# Patient Record
Sex: MALE | Race: WHITE | NOT HISPANIC OR LATINO | URBAN - METROPOLITAN AREA
[De-identification: names, ages, dates, MRNs, and addresses within clinical notes are randomized per-mention and may not be internally consistent; named-entity substitution may affect disease eponyms.]

---

## 2017-01-01 ENCOUNTER — COMMUNICATION - HEALTHEAST (OUTPATIENT)
Dept: INTERNAL MEDICINE | Facility: CLINIC | Age: 72
End: 2017-01-01

## 2017-01-01 ENCOUNTER — OFFICE VISIT - HEALTHEAST (OUTPATIENT)
Dept: PULMONOLOGY | Facility: OTHER | Age: 72
End: 2017-01-01

## 2017-01-01 ENCOUNTER — COMMUNICATION - HEALTHEAST (OUTPATIENT)
Dept: PULMONOLOGY | Facility: OTHER | Age: 72
End: 2017-01-01

## 2017-01-01 ENCOUNTER — AMBULATORY - HEALTHEAST (OUTPATIENT)
Dept: PULMONOLOGY | Facility: OTHER | Age: 72
End: 2017-01-01

## 2017-01-01 ENCOUNTER — RECORDS - HEALTHEAST (OUTPATIENT)
Dept: ADMINISTRATIVE | Facility: OTHER | Age: 72
End: 2017-01-01

## 2017-01-01 ENCOUNTER — OFFICE VISIT - HEALTHEAST (OUTPATIENT)
Dept: INTERNAL MEDICINE | Facility: CLINIC | Age: 72
End: 2017-01-01

## 2017-01-01 ENCOUNTER — COMMUNICATION - HEALTHEAST (OUTPATIENT)
Dept: SCHEDULING | Facility: CLINIC | Age: 72
End: 2017-01-01

## 2017-01-01 ENCOUNTER — HOSPITAL ENCOUNTER (OUTPATIENT)
Dept: ULTRASOUND IMAGING | Facility: HOSPITAL | Age: 72
Discharge: HOME | End: 2017-05-05
Attending: SURGERY

## 2017-01-01 ENCOUNTER — OFFICE VISIT - HEALTHEAST (OUTPATIENT)
Dept: CARDIOLOGY | Facility: CLINIC | Age: 72
End: 2017-01-01

## 2017-01-01 ENCOUNTER — COMMUNICATION - HEALTHEAST (OUTPATIENT)
Dept: FAMILY MEDICINE | Facility: CLINIC | Age: 72
End: 2017-01-01

## 2017-01-01 ENCOUNTER — COMMUNICATION - HEALTHEAST (OUTPATIENT)
Dept: SLEEP MEDICINE | Facility: CLINIC | Age: 72
End: 2017-01-01

## 2017-01-01 ENCOUNTER — HOSPITAL ENCOUNTER (OUTPATIENT)
Dept: CT IMAGING | Facility: HOSPITAL | Age: 72
Discharge: HOME | End: 2017-05-12
Attending: SURGERY

## 2017-01-01 ENCOUNTER — AMBULATORY - HEALTHEAST (OUTPATIENT)
Dept: EDUCATION SERVICES | Facility: CLINIC | Age: 72
End: 2017-01-01

## 2017-01-01 ENCOUNTER — ANESTHESIA - HEALTHEAST (OUTPATIENT)
Dept: CARDIOLOGY | Facility: CLINIC | Age: 72
End: 2017-01-01

## 2017-01-01 ENCOUNTER — HOSPITAL ENCOUNTER (OUTPATIENT)
Dept: CT IMAGING | Facility: HOSPITAL | Age: 72
Discharge: HOME | End: 2017-01-17
Attending: INTERNAL MEDICINE

## 2017-01-01 ENCOUNTER — AMBULATORY - HEALTHEAST (OUTPATIENT)
Dept: CARDIOLOGY | Facility: CLINIC | Age: 72
End: 2017-01-01

## 2017-01-01 DIAGNOSIS — I48.20 CHRONIC ATRIAL FIBRILLATION (H): ICD-10-CM

## 2017-01-01 DIAGNOSIS — I50.32 CHRONIC DIASTOLIC HEART FAILURE (H): ICD-10-CM

## 2017-01-01 DIAGNOSIS — E11.9 DIABETES MELLITUS, TYPE 2 (H): ICD-10-CM

## 2017-01-01 DIAGNOSIS — G47.33 OBSTRUCTIVE SLEEP APNEA: ICD-10-CM

## 2017-01-01 DIAGNOSIS — J84.9 ILD (INTERSTITIAL LUNG DISEASE) (H): ICD-10-CM

## 2017-01-01 DIAGNOSIS — E11.9 DIABETES (H): ICD-10-CM

## 2017-01-01 DIAGNOSIS — Z00.01 ENCOUNTER FOR GENERAL ADULT MEDICAL EXAMINATION WITH ABNORMAL FINDINGS: ICD-10-CM

## 2017-01-01 DIAGNOSIS — D64.9 ANEMIA: ICD-10-CM

## 2017-01-01 DIAGNOSIS — I50.9 CHF (CONGESTIVE HEART FAILURE) (H): ICD-10-CM

## 2017-01-01 DIAGNOSIS — J84.10 PULMONARY FIBROSIS (H): ICD-10-CM

## 2017-01-01 DIAGNOSIS — I10 ESSENTIAL HYPERTENSION: ICD-10-CM

## 2017-01-01 DIAGNOSIS — J96.21 ACUTE ON CHRONIC RESPIRATORY FAILURE WITH HYPOXIA (H): ICD-10-CM

## 2017-01-01 DIAGNOSIS — R05.9 COUGH: ICD-10-CM

## 2017-01-01 DIAGNOSIS — J84.9 INTERSTITIAL LUNG DISEASE (H): ICD-10-CM

## 2017-01-01 DIAGNOSIS — R05.3 CHRONIC COUGH: ICD-10-CM

## 2017-01-01 DIAGNOSIS — F41.8 DEPRESSION WITH ANXIETY: ICD-10-CM

## 2017-01-01 DIAGNOSIS — Z71.89 ADVANCED CARE PLANNING/COUNSELING DISCUSSION: ICD-10-CM

## 2017-01-01 DIAGNOSIS — E11.9 TYPE 2 DIABETES MELLITUS WITHOUT COMPLICATION, WITHOUT LONG-TERM CURRENT USE OF INSULIN (H): ICD-10-CM

## 2017-01-01 DIAGNOSIS — E87.6 HYPOKALEMIA: ICD-10-CM

## 2017-01-01 DIAGNOSIS — E78.5 HYPERLIPIDEMIA: ICD-10-CM

## 2017-01-01 DIAGNOSIS — I25.10 CORONARY ARTERY DISEASE INVOLVING NATIVE CORONARY ARTERY OF NATIVE HEART WITHOUT ANGINA PECTORIS: ICD-10-CM

## 2017-01-01 DIAGNOSIS — I71.40 ABDOMINAL AORTIC ANEURYSM (AAA) WITHOUT RUPTURE (H): ICD-10-CM

## 2017-01-01 DIAGNOSIS — Z12.11 SCREENING FOR COLON CANCER: ICD-10-CM

## 2017-01-01 DIAGNOSIS — I71.40 ANEURYSM OF ABDOMINAL VESSEL (H): ICD-10-CM

## 2017-01-01 DIAGNOSIS — J96.11 CHRONIC RESPIRATORY FAILURE WITH HYPOXIA (H): ICD-10-CM

## 2017-01-01 DIAGNOSIS — I48.19 PERSISTENT ATRIAL FIBRILLATION (H): ICD-10-CM

## 2017-01-01 DIAGNOSIS — J30.89 PERENNIAL ALLERGIC RHINITIS, UNSPECIFIED ALLERGIC RHINITIS TRIGGER: ICD-10-CM

## 2017-01-01 DIAGNOSIS — I71.40 AAA (ABDOMINAL AORTIC ANEURYSM) (H): ICD-10-CM

## 2017-01-01 DIAGNOSIS — E66.9 OBESITY, UNSPECIFIED: ICD-10-CM

## 2017-01-01 DIAGNOSIS — I50.31 ACUTE DIASTOLIC CHF (CONGESTIVE HEART FAILURE) (H): ICD-10-CM

## 2017-01-01 DIAGNOSIS — E11.8 TYPE 2 DIABETES MELLITUS WITH COMPLICATION, WITHOUT LONG-TERM CURRENT USE OF INSULIN (H): ICD-10-CM

## 2017-01-01 DIAGNOSIS — I48.0 PAROXYSMAL ATRIAL FIBRILLATION (H): ICD-10-CM

## 2017-01-01 DIAGNOSIS — R60.9 EDEMA: ICD-10-CM

## 2017-01-01 DIAGNOSIS — I42.2 HYPERTROPHIC CARDIOMYOPATHY (H): ICD-10-CM

## 2017-01-01 DIAGNOSIS — R09.82 POST-NASAL DRIP: ICD-10-CM

## 2017-01-01 DIAGNOSIS — J84.9 CHRONIC INTERSTITIAL LUNG DISEASE (H): ICD-10-CM

## 2017-01-01 DIAGNOSIS — K21.9 GASTROESOPHAGEAL REFLUX DISEASE WITHOUT ESOPHAGITIS: ICD-10-CM

## 2017-01-01 DIAGNOSIS — E78.2 MIXED HYPERLIPIDEMIA: ICD-10-CM

## 2017-01-01 DIAGNOSIS — I50.33 ACUTE ON CHRONIC DIASTOLIC HEART FAILURE (H): ICD-10-CM

## 2017-01-01 DIAGNOSIS — E11.9 TYPE 2 DIABETES MELLITUS, CONTROLLED (H): ICD-10-CM

## 2017-01-01 LAB
ANA SER QL: 1.5 U
CHOLEST SERPL-MCNC: 161 MG/DL
FASTING STATUS PATIENT QL REPORTED: YES
HBA1C MFR BLD: 7.6 % (ref 3.5–6)
HBA1C MFR BLD: 7.7 % (ref 3.5–6)
HBA1C MFR BLD: 7.8 % (ref 3.5–6)
HDLC SERPL-MCNC: 33 MG/DL
LDLC SERPL CALC-MCNC: 95 MG/DL
TRIGL SERPL-MCNC: 164 MG/DL

## 2017-01-01 RX ORDER — GABAPENTIN 300 MG/1
CAPSULE ORAL
Qty: 270 CAPSULE | Refills: 3 | Status: SHIPPED | OUTPATIENT
Start: 2017-01-01

## 2017-01-01 RX ORDER — BISOPROLOL FUMARATE 5 MG/1
5 TABLET, FILM COATED ORAL DAILY
Qty: 90 TABLET | Refills: 0 | Status: SHIPPED | OUTPATIENT
Start: 2017-01-01 | End: 2018-08-08

## 2017-01-01 RX ORDER — GLIPIZIDE 10 MG/1
TABLET, FILM COATED, EXTENDED RELEASE ORAL
Qty: 90 TABLET | Refills: 1 | Status: SHIPPED | OUTPATIENT
Start: 2017-01-01

## 2017-01-01 RX ORDER — FUROSEMIDE 40 MG
TABLET ORAL
Qty: 60 TABLET | Refills: 0 | Status: SHIPPED
Start: 2017-01-01

## 2017-01-01 RX ORDER — GLUCOSAMINE HCL/CHONDROITIN SU 500-400 MG
1 CAPSULE ORAL 2 TIMES DAILY
Qty: 100 EACH | Refills: 3 | Status: SHIPPED | OUTPATIENT
Start: 2017-01-01

## 2017-01-01 RX ORDER — IRBESARTAN 300 MG/1
300 TABLET ORAL AT BEDTIME
Qty: 90 TABLET | Refills: 3 | Status: SHIPPED
Start: 2017-01-01

## 2017-01-01 ASSESSMENT — MIFFLIN-ST. JEOR
SCORE: 1569.54
SCORE: 1569.54
SCORE: 1565
SCORE: 1601.29
SCORE: 1596.76
SCORE: 1583.15

## 2018-01-01 ENCOUNTER — COMMUNICATION - HEALTHEAST (OUTPATIENT)
Dept: SCHEDULING | Facility: CLINIC | Age: 73
End: 2018-01-01

## 2021-05-26 ENCOUNTER — RECORDS - HEALTHEAST (OUTPATIENT)
Dept: ADMINISTRATIVE | Facility: CLINIC | Age: 76
End: 2021-05-26

## 2021-05-29 ENCOUNTER — RECORDS - HEALTHEAST (OUTPATIENT)
Dept: ADMINISTRATIVE | Facility: CLINIC | Age: 76
End: 2021-05-29

## 2021-05-30 ENCOUNTER — RECORDS - HEALTHEAST (OUTPATIENT)
Dept: ADMINISTRATIVE | Facility: CLINIC | Age: 76
End: 2021-05-30

## 2021-05-30 VITALS — WEIGHT: 208 LBS | BODY MASS INDEX: 36.85 KG/M2

## 2021-05-30 VITALS — WEIGHT: 206 LBS | BODY MASS INDEX: 36.49 KG/M2

## 2021-05-30 VITALS — WEIGHT: 210 LBS | BODY MASS INDEX: 37.2 KG/M2

## 2021-05-31 ENCOUNTER — RECORDS - HEALTHEAST (OUTPATIENT)
Dept: ADMINISTRATIVE | Facility: CLINIC | Age: 76
End: 2021-05-31

## 2021-05-31 VITALS — WEIGHT: 205 LBS | HEIGHT: 63 IN | BODY MASS INDEX: 36.32 KG/M2

## 2021-05-31 VITALS — BODY MASS INDEX: 36.5 KG/M2 | HEIGHT: 63 IN | WEIGHT: 206 LBS

## 2021-05-31 VITALS — HEIGHT: 63 IN | WEIGHT: 206 LBS | BODY MASS INDEX: 36.5 KG/M2

## 2021-05-31 VITALS — WEIGHT: 209 LBS | BODY MASS INDEX: 37.02 KG/M2

## 2021-05-31 VITALS — WEIGHT: 212 LBS | BODY MASS INDEX: 37.56 KG/M2 | HEIGHT: 63 IN

## 2021-05-31 VITALS — BODY MASS INDEX: 37.2 KG/M2 | WEIGHT: 210 LBS

## 2021-05-31 VITALS — BODY MASS INDEX: 37.74 KG/M2 | HEIGHT: 63 IN | WEIGHT: 213 LBS

## 2021-05-31 VITALS — BODY MASS INDEX: 37.38 KG/M2 | WEIGHT: 211 LBS

## 2021-05-31 VITALS — BODY MASS INDEX: 37.03 KG/M2 | HEIGHT: 63 IN | WEIGHT: 209 LBS

## 2021-06-01 ENCOUNTER — RECORDS - HEALTHEAST (OUTPATIENT)
Dept: ADMINISTRATIVE | Facility: CLINIC | Age: 76
End: 2021-06-01

## 2021-06-08 NOTE — PROGRESS NOTES
Assessment/Plan:        Diagnoses and all orders for this visit:    Chronic diastolic heart failure    Obstructive sleep apnea - uses BiPAP    Pulmonary fibrosis - possibly familial - both parents had pumonary fibrosis.  -     Ambulatory Referral to Pulmonary Rehab  -     CT Chest High Resolution Without Contrast; Future; Expected date: 1/17/17  -     Check Pulse Oximetry while ambulating    Complex presentation of what appears to be mild-moderate pulmonary hypertension in setting of known chronic heart failure, SOFI (treated) and ILD.  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  However it has been stable from an imaging standpoint for some time.    The severity of his pulmonary hypertension is within reason given the above comorbidities.  If he had poor RV function or higher estimated RVSP (eg 70s or higher) I would be concerned for pulmonary hypertension out of proportion to these comorbidities, limitations of TTE accepted.    I agree with Dr. Silver that he is not a clear candidate for advanced therapies for pulmonary hypertension.  He is well treated from a heart failure standpoint. SOFI is fully treated.  ILD is only factors that is not getting aggressive therapy.    The complications of prednisone in this gentleman on maximal oral therapy for diabetes are evident.  I cannot predict whether this would be helpful.  Wu has been hesitant about invasive procedures in the past.    Plan    1) Oxygen   -Room air is fine at rest   -At night use 2 lpm through the bipap   -with exertion - use 4 lpm.  We will send in a prescription for a portable concentrator.    2) CT scan to look at your lung scarring and check your lymph nodes    3) Pulmonary rehab prescription - [place]    Due to desaturation of SaO2 less than or equal to 88% on Room air from (diagnosis) interstitial lung disease, home oxygen therapy will benefit my patient's condition.  The patient has tried (or considered) medications with limited  success and oxygen is still required.  This patient is mobile in the home and requires portability.    Patient needs portable oxygen with ambulation at 4LPM/NC.  Patient also needs POC.  OK for conserving device.  OK to do titration study if needed--keep oxygen saturation 88%.  Please issue conserving device with appropriate titrated setting after patient completes successful assessment.    Patients room air saturation is 93%.  With ambulation on room air his saturation dropped to 81%.  On 2 lpm his oxygen saturation was 86% with ambulation.  On 4 lpm his saturation was 89% with ambulation.          Total time greater than 40 minutes greater than 50% spent on counseling and coordination of care.  Subjective:    Patient ID: Aydin Knutson Jr. is a 71 y.o. male.    HPI Comments: 71M with lung fibrosis and SOFI.    He was hospitalized for heart failure in November.  He was in the hospital for 4 days.  He had a syncopal event on a plane.  After that he improved a bit and then worsened and now has started improving just a little.    Diuresed a lot.    On RA at rest he is usually around 91%.  With minimal activity he drops to mid/low 80s.  Can get to 70s with sig exertion.    Extensive discussion about pathophysiology, background of his illness with Wu and his wife.    Pulepifanio Silver        --- from previous  Here for follow up of lung fibrosis and SOFI.    Breathing doesn't limit his activity it is primarily musculoskeletal issues that slow him down.  He can walk around at work at his own pace unlimited.    Both of his parents had pulmonary fibrosis.    He wears his CPAP every night.  His usage is down a little bit due to waking up at night to urinate.    He is set to have surgery at some point in the next few months.  It will be a right hip redo.    He has some lower extremity edema.  This has been stable since his 30s.    --- from previous  Here for follow up of bipap.  He had a retritration and had his setting  decreased.    He wears 6-7 days/week.  He usually wears it more than 4 hours.    He has trouble falling back asleep after waking to use the bathroom.    His cough is persistent.      --- from previous    Having some trouble with his BiPAP mask.  The pressures are too high and keep him awake.  We had considered having a retitration.    His breathing is doing okay.  He still is just a bit limited.  Overall he quantitatively doesn't feel much change over the last several years.    His cough is improved somewhat.  This time of year he does have some upper airway allergic rhinitis type symptoms.  This responded well to Flonase in the past and he's been started on this by Dr. Austin.            Review of Systems  Current Outpatient Prescriptions on File Prior to Visit   Medication Sig Dispense Refill     ALPRAZolam (XANAX) 0.25 MG tablet Take 1 tablet (0.25 mg total) by mouth bedtime as needed. 30 tablet 1     aspirin 81 MG EC tablet Take 81 mg by mouth daily.       atorvastatin (LIPITOR) 80 MG tablet Take 80 mg by mouth bedtime.       bisoprolol (ZEBETA) 5 MG tablet Take 1 tablet (5 mg total) by mouth daily. 30 tablet 11     blood sugar diagnostic (GLUCOSE BLOOD) Strp Use As Directed. Accu check Loren and BS checks every morning       blood sugar diagnostic Strp Use 1 strip As Directed 2 times a day at 6:00 am and 4:00 pm. (Patient taking differently: Use 1 strip As Directed daily. ) 100 strip 11     buPROPion (WELLBUTRIN XL) 150 MG 24 hr tablet Take 1 tablet (150 mg total) by mouth daily. 30 tablet 5     co-enzyme Q-10 50 mg capsule Take 50 mg by mouth daily.       diltiazem (DILACOR XR) 240 MG 24 hr capsule Take 240 mg by mouth daily.       furosemide (LASIX) 40 MG tablet Take 1 tablet (40 mg total) by mouth daily. 90 tablet 0     gabapentin (NEURONTIN) 300 MG capsule Take 300 mg by mouth every morning.       glipiZIDE (GLUCOTROL) 10 MG 24 hr tablet Take 10 mg by mouth 2 (two) times a day.       irbesartan (AVAPRO) 300  MG tablet Take 300 mg by mouth daily.   3     metFORMIN (GLUCOPHAGE) 1000 MG tablet Take 1,000 mg by mouth 2 (two) times a day with meals.       omeprazole (PRILOSEC) 20 MG capsule Take 20 mg by mouth daily.       ONETOUCH ULTRA TEST strips USE 1 STRIP AS DIRECTED 2 TIMES A DAY AT 6:00 AM AND 4:00 PM. 100 each 10     potassium chloride (KLOR-CON) 10 MEQ CR tablet Take 1 tablet (10 mEq total) by mouth daily. 90 tablet 0     No current facility-administered medications on file prior to visit.      Visit Vitals     /80     Pulse 65     Resp 18     Wt 206 lb (93.4 kg)     SpO2 96%  Comment: 2LPM     BMI 36.49 kg/m2             Objective:    Physical Exam   Constitutional: He is oriented to person, place, and time. He appears well-developed and well-nourished. No distress.   HENT:   Head: Normocephalic.   Nose: Nose normal.   Eyes: Pupils are equal, round, and reactive to light. Right eye exhibits no discharge. Left eye exhibits no discharge. No scleral icterus.   Cardiovascular: Normal rate and regular rhythm.  Exam reveals no gallop and no friction rub.    No murmur heard.  Pulmonary/Chest: Effort normal. No respiratory distress. He has no wheezes. He has rales.   Musculoskeletal: He exhibits no edema or tenderness.   Neurological: He is alert and oriented to person, place, and time. No cranial nerve deficit.   Skin: Skin is warm and dry. No rash noted. He is not diaphoretic. No erythema. No pallor.   Psychiatric: He has a normal mood and affect. His behavior is normal. Judgment and thought content normal.               Medical History  Active Ambulatory (Non-Hospital) Problems    Diagnosis     Chronic diastolic heart failure     Hypokalemia     ST segment depression     DM - Type 2 diabetes mellitus without complication, without long-term current use of insulin     Edema - long term - venous insufficiency     H/O total hip arthroplasty - right hip - Eryn recall - has seen [facility] - watchful waiting -  "reassess in four months ([facility], 10/6/16)     Arteriosclerosis of coronary artery     Phimosis - has urology follow-up in September     Hypovitaminosis D - [place] Vitamin D [place]     GERD (gastroesophageal reflux disease) - acid reflux since he was a teenager     Cataract - on eye exam, July 2015, [facility]     Cough - AM and PM.     Allergic rhinitis - chronic posterior rhinorrhea     AAA - abdominal aortic aneurysm - followed by Dr. Warner     Carcinoma of the Prostate Gland     BP - high blood pressure     Meralgia paresthetica on the left     Obesity - LOSING weight gradually     Hypertrophic \"Non-obstructive\" cardiomyopathy - Dr. Joy, Senior Consulting Cardiologist - [facility] - March, 2016     Pulmonary fibrosis - possibly familial - both parents had pumonary fibrosis.     Obstructive sleep apnea - uses BiPAP     Hypoxemia     DM - Type 2 - A1c > 7% - **NO DIABETIC RETINOPATHY OR EDEMA - . [facility] - 9/2/16**     Hyperlipidemia     Erectile dysfunction     Chronic Reflux Esophagitis     Past Medical History   Diagnosis Date     AAA - abdominal aortic aneurysm - followed by Dr. Warner 12/27/2014     Arteriosclerosis of coronary artery 7/27/2016     [facility]-> [facility] 4/19/2016     CAD (coronary artery disease)      DM II (diabetes mellitus, type II), controlled      GERD (gastroesophageal reflux disease)      HTN (hypertension)      Hyperlipidemia 12/11/2014     Influenza A 12/15/2014     Pneumonia      Prostate cancer      Pulmonary fibrosis      Sleep apnea             Allergies  Allergies   Allergen Reactions     Amlodipine Swelling     Amoxicillin      Severe yeast infection and ended up in ER for this along with bleeding from area      Lisinopril Cough     Cough, but **may** have been from lung disease, not the drug.  [facility] - 3/9/15                            Data Review - imaging, labs, and ekgs listed below " were reviewed by me.  CXR and CT images  interpreted personally.     Past Labs  Office Visit on 12/19/2016   Component Date Value     Vitamin D, Total (25-Hyd* 12/19/2016 57.1    Office Visit on 11/16/2016   Component Date Value     Sodium 11/16/2016 137      Potassium 11/16/2016 4.7      Chloride 11/16/2016 98      CO2 11/16/2016 28      Anion Gap, Calculation 11/16/2016 11      Glucose 11/16/2016 241*     Calcium 11/16/2016 9.4      BUN 11/16/2016 18      Creatinine 11/16/2016 1.29      GFR MDRD Af Amer 11/16/2016 >60      GFR MDRD Non Af Amer 11/16/2016 55*   Admission on 11/08/2016, Discharged on 11/11/2016   Component Date Value     Sodium 11/08/2016 137      Potassium 11/08/2016 4.3      Chloride 11/08/2016 101      CO2 11/08/2016 28      Anion Gap, Calculation 11/08/2016 8      Glucose 11/08/2016 210*     Calcium 11/08/2016 9.0      BUN 11/08/2016 18      Creatinine 11/08/2016 1.14      GFR MDRD Af Amer 11/08/2016 >60      GFR MDRD Non Af Amer 11/08/2016 >60      INR 11/08/2016 1.07      PTT 11/08/2016 30      BNP 11/08/2016 1313*     Troponin I 11/08/2016 0.08      Bilirubin, Total 11/08/2016 0.5      Bilirubin, Direct 11/08/2016 0.2      Protein, Total 11/08/2016 6.4      Albumin 11/08/2016 3.6      Alkaline Phosphatase 11/08/2016 65      AST 11/08/2016 23      ALT 11/08/2016 36      WBC 11/08/2016 10.0      RBC 11/08/2016 4.14*     Hemoglobin 11/08/2016 12.3*     Hematocrit 11/08/2016 36.8*     MCV 11/08/2016 89      MCH 11/08/2016 29.8      MCHC 11/08/2016 33.5      RDW 11/08/2016 15.4*     Platelets 11/08/2016 196      MPV 11/08/2016 8.3      Neutrophils % 11/08/2016 80*     Lymphocytes % 11/08/2016 9*     Monocytes % 11/08/2016 11*     Eosinophils % 11/08/2016 1      Basophils % 11/08/2016 0      Neutrophils Absolute 11/08/2016 7.9*     Lymphocytes Absolute 11/08/2016 0.9      Monocytes Absolute 11/08/2016 1.1*     Eosinophils Absolute 11/08/2016 0.1      Basophils Absolute 11/08/2016 0.0       VENTRICULAR RATE 11/08/2016 69      ATRIAL RATE 11/08/2016 69      P-R INTERVAL 11/08/2016 202      QRS DURATION 11/08/2016 90      Q-T INTERVAL 11/08/2016 426      QTC CALCULATION (BEZET) 11/08/2016 456      P Axis 11/08/2016 5      R AXIS 11/08/2016 -22      T AXIS 11/08/2016 -23      MUSE DIAGNOSIS 11/08/2016                      Value:Normal sinus rhythm  ST & T wave abnormality, consider anterior ischemia  Abnormal ECG  When compared with ECG of 16-DEC-2014 00:44,  Premature atrial complexes are no longer Present  T wave inversion more evident in Inferior leads  T wave inversion now evident in Anterior leads  T wave amplitude has increased in Lateral leads  Confirmed by CONNOR KELLY, TONYA LOC: ([id number]) on 11/9/2016 10:39:31 AM       Troponin I 11/08/2016 0.07      CK-MB 11/08/2016 2      Glucose, POC 11/08/2016 96      Glucose, POC 11/08/2016 157      Hemoglobin A1c 11/09/2016 7.6*     Sodium 11/09/2016 139      Potassium 11/09/2016 3.9      Chloride 11/09/2016 98      CO2 11/09/2016 32*     Anion Gap, Calculation 11/09/2016 9      Glucose 11/09/2016 155*     BUN 11/09/2016 15      Creatinine 11/09/2016 1.10      GFR MDRD Af Amer 11/09/2016 >60      GFR MDRD Non Af Amer 11/09/2016 >60      Bilirubin, Total 11/09/2016 1.1*     Calcium 11/09/2016 9.1      Protein, Total 11/09/2016 6.8      Albumin 11/09/2016 3.6      Alkaline Phosphatase 11/09/2016 65      AST 11/09/2016 22      ALT 11/09/2016 34      CK-MB 11/09/2016 2      Troponin I 11/09/2016 0.06      Glucose, POC 11/09/2016 148      Glucose, POC 11/09/2016 182      Glucose, POC 11/09/2016 132      Glucose, POC 11/09/2016 165      WBC 11/10/2016 9.4      RBC 11/10/2016 4.69      Hemoglobin 11/10/2016 13.8*     Hematocrit 11/10/2016 42.0      MCV 11/10/2016 90      MCH 11/10/2016 29.4      MCHC 11/10/2016 32.8      RDW 11/10/2016 15.8*     Platelets 11/10/2016 214      MPV 11/10/2016 8.1      Sodium 11/10/2016 139      Potassium 11/10/2016 3.3*     Chloride  11/10/2016 95*     CO2 11/10/2016 32*     Anion Gap, Calculation 11/10/2016 12      Glucose 11/10/2016 138*     Calcium 11/10/2016 9.1      BUN 11/10/2016 19      Creatinine 11/10/2016 1.17      GFR MDRD Af Amer 11/10/2016 >60      GFR MDRD Non Af Amer 11/10/2016 >60      Glucose, POC 11/10/2016 156      Glucose, POC 11/10/2016 205      Glucose, POC 11/10/2016 196      Glucose, POC 11/10/2016 171      Potassium 11/11/2016 4.0      Glucose, POC 11/11/2016 158    Office Visit on 10/26/2016   Component Date Value     BUN 10/26/2016 20      Creatinine 10/26/2016 1.32*     GFR MDRD Af Amer 10/26/2016 >60      GFR MDRD Non Af Amer 10/26/2016 54*   Physical on 07/27/2016   Component Date Value     Sed Rate 07/27/2016 13      CRP 07/27/2016 0.4      WBC 07/27/2016 9.9      RBC 07/27/2016 4.76      Hemoglobin 07/27/2016 14.3      Hematocrit 07/27/2016 42.8      MCV 07/27/2016 90      MCH 07/27/2016 30.1      MCHC 07/27/2016 33.4      RDW 07/27/2016 13.7      Platelets 07/27/2016 235      MPV 07/27/2016 8.5      Sodium 07/27/2016 141      Potassium 07/27/2016 4.7      Chloride 07/27/2016 98      CO2 07/27/2016 27      Anion Gap, Calculation 07/27/2016 16      Glucose 07/27/2016 114      BUN 07/27/2016 18      Creatinine 07/27/2016 1.23      GFR MDRD Af Amer 07/27/2016 >60      GFR MDRD Non Af Amer 07/27/2016 58*     Bilirubin, Total 07/27/2016 0.7      Calcium 07/27/2016 9.8      Protein, Total 07/27/2016 7.3      Albumin 07/27/2016 4.1      Alkaline Phosphatase 07/27/2016 79      AST 07/27/2016 18      ALT 07/27/2016 16      Color, UA 07/27/2016 Yellow      Clarity, UA 07/27/2016 Clear      Glucose, UA 07/27/2016 Negative      Bilirubin, UA 07/27/2016 Negative      Ketones, UA 07/27/2016 Negative      Specific Gravity, UA 07/27/2016 1.015      Blood, UA 07/27/2016 Negative      pH, UA 07/27/2016 8.5*     Protein, UA 07/27/2016 Trace*     Urobilinogen, UA 07/27/2016 0.2 E.U./dL      Nitrite, UA 07/27/2016 Negative       Leukocytes, UA 07/27/2016 Negative      Bacteria, UA 07/27/2016 None Seen      RBC, UA 07/27/2016 0-2      WBC, UA 07/27/2016 None Seen      Squam Epithel, UA 07/27/2016 5-10*     Microalbumin, Random Uri* 07/27/2016 0.90      Creatinine, Urine 07/27/2016 91.9      Microalbumin/Creatinine * 07/27/2016 9.8      Cholesterol 07/27/2016 154      Triglycerides 07/27/2016 162*     HDL Cholesterol 07/27/2016 29*     LDL Calculated 07/27/2016 93      Patient Fasting > 8hrs? 07/27/2016 Yes      Vitamin D, Total (25-Hyd* 07/27/2016 27.4*     Hemoglobin A1c 07/27/2016 6.7*     Past Imaging  CT scan reviewed.  Fibrosis overall similar to previous.      Outside records included from previous visits but not reviewed today    Per notes - formal report not available  Full PFTs: moderate to moderately severe restriction with TLC of 2.87 59% predicted, no obstruction, moderate decrease in DLCO at 43% predicted. There has been drop in TLC and DLCO compared to 2009      Spirometry today: severe restriction with decrease in FVC and FEV1 compared to 11/14: FEV1 1.50 60%, FVC 1.66 49% previously 1.58 and 1.83 respectively, today unable to exhale 6 seconds  Sleep Study  1. Your sleep study has shown that you have Obstructive Sleep Apnea (SOFI).  2. During the study, your technologist was able to place you on PAP treatment and determine a reasonably effective treatment pressure.  3. An order was placed for PAP therapy. The [facility] ([facility]) department will contact you to set-up an appointment so that you can receive your PAP equipment for home use.  4. If you have not heard from the E by 12/20/14, contact them at [phone].  5. Your provider s clinic will be contacting you separately to set up a follow-up visit in 6 to 8 weeks.   6. If you have any questions or problems with your treatment plan, please contact your ordering provider s clinic as the [facility] technologists are not able to discuss the details  "of your sleep procedure.    **Notice to Medicare Patients: This process may take up to 2 weeks to complete due to Medicare regulations. If you have not heard from the [facility] after 1/12/15, please contact the [facility] at [phone].    Titration Polysomnogram Interpretation    Date of read: 12/17/2014       Estimated body mass index is 39.87 kg/(m^2) as calculated from the following:  Height as of this encounter: 5' 3\" (1.6 m).  Weight as of this encounter: 225 lb (102.059 kg).  [place] Score: 7  Neck Circumference (in inches): 16.5    Titration:  1. This study was performed due to previously diagnosed obstructive sleep apnea to evaluate optimal CPAP and Bi-Level PAP S treatment.  2. Sleep medication: self administered  3. Hypopnea Definition: Rule VIII.4.A  4. The total sleep time was: 350.5 minutes. The sleep latency was: 14.4 minutes, which is normal. The REM sleep latency was: 54.5 minutes which was reduced. Sleep efficiency was 69.4 %, which is decreased. The sleep architecture was fragmented with some sleep stage changes and arousals.  5. Sleep archictechture: Stage N1 13.7%, Stage N2 50.4% , Stage N3 14%, Stage R 22%, Stage WASO %.  6. Snoring reported as: soft;eliminated.  7. Respiratory Events: CPAP and Bi-Level PAP S was titrated during the study and resulted in reasonable elimination of apneas, hypopneas, snoring, and oxygen desaturations. The final effective pressure was 22/18 cm/H2O however this pressure was tested for only a brief time at the end of the study. Supine REM was observed at this pressure.  8. Positional data: Lateral AHI 9.7, Lateral RDI 9.7, Lateral TST 72.5 %, Supine AHI 18, Supine RDI 18, Supine TST 27.5 %.  9. The total sleep time with an SpO2 </= 88% was 18.6 minutes.   10. Mask leak was within expected range. Was chinstrap used / was not used no  11. The final effective mask was a [employer] Medium (Simplus) Full-Face mask.    Other:  1. EEG: No epileptiform activity was " noted during this recording.  2. EMG: Occasional periodic limb movements were noted with a PLM index of 18.3 and a PLM arousal index of 1.7.  3. EKG: No significant cardiac arrhythmias were noted.  4. TCM: Mean 58.8 mmHg, Max 65.1 mmHg, baseline 54-55 mmHg.    Recommendations:    1. Suuggest Bi-Level PAP S at a pressure of 22/18 cm/H2O, with heated humidity and close clinical follow-up.  2. Consider elevation of head of bed 15 - 30 degrees. This was not tested during the study.  3. Would suggest optimizing sleep hygiene measures, especially avoiding alcohol and sedatives. Avoiding sleep deprivation would also be beneficial.  4. The patient should avoid dangers of driving while excessively drowsy.  5. Weight management.    I attest that I have conducted an epoch by epoch review of all of the raw data for this sleep study or procedure.    Natividad Silva MD    TTE  CONCLUSION:   1) LVEF 65%. Normal LV size and systolic function. There is basal   septal hypertrophy present measuring 1.9cm, appears consistent   with hypertrophic cardiomyopathy. The mean gradient across the   LVOT is 3mmHg (normal). There is no AVANI present. No regional   wall motion abnormalities.   2) Normal RV size and function.   3) Mild LA dilatation.   4) Trace aortic regurgitation. Mild sclerosis of aortic valve.   5) No prior studies for comparison.     Left Ventricular Ejection Fraction: 65 %       ICD Codes:   Technical Quality:     Patient Vital Signs: Ht HT 63   Ht(in):   Wt (lb):218   BSA 2.10     BP: 110 / 60     >60 minutes spent on visit >50% counseling and coordination of care.

## 2021-06-08 NOTE — PROGRESS NOTES
1/30/2017     Visit Vitals     /80 (Patient Site: Left Arm, Patient Position: Sitting, Cuff Size: Adult Regular)     Pulse 71     SpO2 97%  Comment: 2L       Past Medical History   Diagnosis Date     AAA - abdominal aortic aneurysm - followed by Dr. Warner 12/27/2014     Arteriosclerosis of coronary artery 7/27/2016     Overview:  [facility]gi[facility] 1995     [facility] ---> Lotrisone 4/19/2016     CAD (coronary artery disease)      stent times 4     DM II (diabetes mellitus, type II), controlled      GERD (gastroesophageal reflux disease)      HTN (hypertension)      Hyperlipidemia 12/11/2014     Influenza A 12/15/2014     Pneumonia      Prostate cancer      Pulmonary fibrosis      Sleep apnea        Social History     Social History     Marital status:      Spouse name: N/A     Number of children: N/A     Years of education: N/A     Occupational History     Not on file.     Social History Main Topics     Smoking status: Former Smoker     Packs/day: 0.50     Years: 20.00     Types: Cigarettes     Quit date: 7/11/1986     Smokeless tobacco: Never Used      Comment: Quit nearly 30 years ago      Alcohol use No     Drug use: No     Sexual activity: Not Currently     Partners: Female     Other Topics Concern     Not on file     Social History Narrative     here at [facility]       Family History   Problem Relation Age of Onset     Pulmonary fibrosis Mother      Depression Mother      Pulmonary fibrosis Father      Depression Father      Depression Sister      Diabetes Sister      Pulmonary embolism Sister      Depression Brother      Depression Brother      Depression Brother      Depression Sister      Depression Sister        As part of this visit I have today personally reviewed past medical history, family medical history, social history (specifically including tobacco use), current medications and intolerances/allergies.  I have updated and/or or corrected these areas of  history as may have been appropriate.    Allergies   Allergen Reactions     Amlodipine Swelling     Amoxicillin      Severe yeast infection and ended up in ER for this along with bleeding from area      Lisinopril Cough     Cough, but **may** have been from lung disease, not the drug.  TBrinkMD - 3/9/15       Current Outpatient Prescriptions   Medication Sig Note     ALPRAZolam (XANAX) 0.25 MG tablet Take 1 tablet (0.25 mg total) by mouth bedtime as needed.      aspirin 81 MG EC tablet Take 81 mg by mouth daily.      atorvastatin (LIPITOR) 80 MG tablet Take 80 mg by mouth bedtime.      bisoprolol (ZEBETA) 5 MG tablet Take 1 tablet (5 mg total) by mouth daily.      blood sugar diagnostic (GLUCOSE BLOOD) Strp Use As Directed. Accu check Loren and BS checks every morning      blood sugar diagnostic Strp Use 1 strip As Directed 2 times a day at 6:00 am and 4:00 pm. (Patient taking differently: Use 1 strip As Directed daily. )      buPROPion (WELLBUTRIN XL) 150 MG 24 hr tablet Take 1 tablet (150 mg total) by mouth daily.      co-enzyme Q-10 50 mg capsule Take 50 mg by mouth daily.      diltiazem (DILACOR XR) 240 MG 24 hr capsule Take 240 mg by mouth daily. 12/14/2016: Increased from 120 ---> 240 by KASIE Joy MD - cardiologist at [facility].  12/14/2016      furosemide (LASIX) 40 MG tablet Take 1 tablet (40 mg total) by mouth daily.      gabapentin (NEURONTIN) 300 MG capsule Take 300 mg by mouth every morning. 12/19/2016: Peripheral neuropathy pain.  12/19/2016      glipiZIDE (GLUCOTROL) 10 MG 24 hr tablet Take 10 mg by mouth 2 (two) times a day.      irbesartan (AVAPRO) 300 MG tablet Take 300 mg by mouth daily.       metFORMIN (GLUCOPHAGE) 1000 MG tablet TAKE ONE TABLET BY MOUTH TWICE A DAY WITH MEALS       omeprazole (PRILOSEC) 20 MG capsule Take 20 mg by mouth daily.      ONETOUCH ULTRA TEST strips USE 1 STRIP AS DIRECTED 2 TIMES A DAY AT 6:00 AM AND 4:00 PM.      potassium chloride (KLOR-CON) 10 MEQ CR  "tablet Take 1 tablet (10 mEq total) by mouth daily.        Patient Active Problem List   Diagnosis     Chronic Reflux Esophagitis     DM - Type 2 - A1c > 7% - **NO DIABETIC RETINOPATHY OR EDEMA - [facility] - 9/2/16**     Hyperlipidemia     Erectile dysfunction     Hypoxemia     Pulmonary fibrosis - possibly familial - both parents had pumonary fibrosis.     Obstructive sleep apnea - uses BiPAP     AAA - abdominal aortic aneurysm - followed by Dr. Warner     Carcinoma of the Prostate Gland     BP - high blood pressure     Meralgia paresthetica on the left     Obesity - LOSING weight gradually     Hypertrophic \"Non-obstructive\" cardiomyopathy - Dr. Joy, Senior Consulting Cardiologist - [facility] - March, 2016     Allergic rhinitis - chronic posterior rhinorrhea     Cough - AM and PM.     Cataract - on eye exam, July 2015, [facility]     Hypovitaminosis D - [place] Vitamin D [place]     GERD (gastroesophageal reflux disease) - acid reflux since he was a teenager     Arteriosclerosis of coronary artery     Phimosis - has urology follow-up in September     H/O total hip arthroplasty - right hip - [facility] recall - has seen [facility] - watchful waiting - reassess in four months ([facility], 10/6/16)     Edema - long term - venous insufficiency     ST segment depression     DM - Type 2 diabetes mellitus without complication, without long-term current use of insulin     Hypokalemia     Chronic diastolic heart failure       The following high BMI interventions were performed this visit: weight monitoring and prescribed dietary intake.  I encouraged him to remain as physically active as he can.  I recommended gradual weight loss.    Diabetes - blood sugars are only occasionally elevated to 135-155, but for most part very good fasting.  2-hour pc are 109-163.  I'm OK with that.  I think that his A1c will fall the next time it's checked.  Lab Results   Component Value Date    " HGBA1C 7.6 (H) 11/09/2016     Lab Results   Component Value Date    MICROALBUR 0.90 07/27/2016       Hypovitaminosis D - most recent vitamin D was satisfactory  VITAMIN D, TOTAL (25-HYDROXY)   Date Value Ref Range Status   12/19/2016 57.1 30.0 - 80.0 ng/mL Final       Lipid status - he is on a statin.  Lab Results   Component Value Date    CHOL 154 07/27/2016    CHOL 130 09/10/2015     Lab Results   Component Value Date    HDL 29 (L) 07/27/2016    HDL 26 (L) 09/10/2015     Lab Results   Component Value Date    LDLCALC 93 07/27/2016    LDLCALC 73 09/10/2015     Lab Results   Component Value Date    TRIG 162 (H) 07/27/2016    TRIG 154 (H) 09/10/2015       Thyroid status - normal TSH within 5 years.  He is not on levothyroxine.  Lab Results   Component Value Date    TSH 2.49 11/18/2014       CBC monitoring - minimal anemia can be observed in my opinion.  He has normocytic cells.  Lab Results   Component Value Date    WBC 9.4 11/10/2016    HGB 13.8 (L) 11/10/2016    HCT 42.0 11/10/2016    MCV 90 11/10/2016     11/10/2016       Most recent CMP showed an elevated CO2, elevated glucose consistent with diabetes, and a minimally elevated bilirubin likely can be safely observed.  Results for orders placed or performed during the hospital encounter of 11/08/16   Comprehensive metabolic panel   Result Value Ref Range    Sodium 139 136 - 145 mmol/L    Potassium 3.9 3.5 - 5.0 mmol/L    Chloride 98 98 - 107 mmol/L    CO2 32 (H) 22 - 31 mmol/L    Anion Gap, Calculation 9 5 - 18 mmol/L    Glucose 155 (H) 70 - 125 mg/dL    BUN 15 8 - 22 mg/dL    Creatinine 1.10 0.70 - 1.30 mg/dL    GFR MDRD Af Amer >60 >60 mL/min/1.73m2    GFR MDRD Non Af Amer >60 >60 mL/min/1.73m2    Bilirubin, Total 1.1 (H) 0.0 - 1.0 mg/dL    Calcium 9.1 8.5 - 10.5 mg/dL    Protein, Total 6.8 6.0 - 8.0 g/dL    Albumin 3.6 3.5 - 5.0 g/dL    Alkaline Phosphatase 65 45 - 120 U/L    AST 22 0 - 40 U/L    ALT 34 0 - 45 U/L     Chief complaint: Following  "fibrosis    Present illness: He has had prominent fibrosis for as long as I have known him.  It is gradually progressive.  He is more short of breath off oxygen.  He is less short of breath on oxygen.  There is no associated pain.  Both of his parents had pulmonary fibrosis.  He sees a pulmonary specialist, Dr. Bird.  Dr. Bird recently changed his oxygen settings.    Over the past 4-6 weeks has had noticed that he simply has to have oxygen with exertion.  He might have been able to get by without it in the past, but no longer.  He walks with 2 liters.  He sleeps with 2 liters.  He spoke with Dr. Bird (pulmonary) a couple of weeks ago and the two of them made no change in flow rates.  He can get by at rest without oxygen and, for the nuisance of it, will take it off if the cannula is annoying him.  He has a home pulse oximeter.    Tremor - he has a fine tremor of the hands, L > R.  This is an intention tremor.  It isn't yet at a point where he believes that he needs to be treated.   He is not interested in adding a drug for this.  \"I take so much crap already.\"    Peripheral edema - we discussed this.  He got inexpensive 15-20 mm hosiery, and this has not proven effective.  He doesn't like the swelling in his feet.  We are obliged to go to a higher compression hosiery.  I'm not totally convinced that 20-30 mm we'll do it, but the 30-40 mm hosiery is so difficult to pull on that I'm reluctant to use it.    Physical examination  General this is an obese 71-year-old  American gentleman in no acute distress.  Heart - regular and without murmur.  Lungs - he has fibrotic rales at the bases bilaterally.  There are not as obvious today as they have been on previous visits.  Ext - 2+ edema despite 15-20 millimeter compressive hosiery.    Impression    1.  Edema, most likely due to venous insufficiency although he does have a history of congestive heart failure.  2.  Pulmonary fibrosis  3.  Compensated congestive " heart failure.  4.  Obesity  5.  Requires oxygen with exertion.  And in bed.  6.  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  7.  Type 2 diabetes    Plan    1.  I discussed the options available for treating an intention tremor.  Clearly a non-cardioselective beta blocker would not be a good idea given his lung status.  I discussed possible Mysoline.  He is not yet ready to be treated.  The tremor is more obvious in the left than in the right hand.  2.  I wrote a prescription for 20-30 mm compressive hose.  I gave him a catalog to review.  3.  Return in 3 months..  4.  I reviewed his blood sugars.  5.  I reviewed his weight diary.  6.  Prescription refills written.      Much or all of the text in this note was generated through the use of Dragon Dictate voice-to-text software.  Errors in spelling or words which seem out of context are unintentional.  Dragon has a significant issue with pronouns and, of course, homonyms.  Errors with words of this sort may escape editing.      Patient Instructions   1.  Try 20-30 mm hosiery.    2.  I have refilled your drugs.    3.  Go to the lab today.    4.  Come back in 3 months.  Make that appointment as you leave today, please.

## 2021-06-08 NOTE — PROGRESS NOTES
CT scan reviewed with Mr. Knutson,  - fibrosis is stable - this means we don't need to consider ILD treatment  - although no contrast on present study, it looks like overall the lymph nodes are smaller.  Their previous increase was probably due to his heart failure    No other changes.

## 2021-06-09 NOTE — PROGRESS NOTES
Assessment/Plan:        Diagnoses and all orders for this visit:    Pulmonary fibrosis - possibly familial - both parents had pumonary fibrosis.    Chronic diastolic heart failure    Obstructive sleep apnea - uses BiPAP    Chronic respiratory failure with hypoxia    Complex presentation of what appears to be mild-moderate pulmonary hypertension in setting of known chronic heart failure, SOFI (treated) and ILD.  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  However it has been stable from an imaging standpoint for some time.    CT scan stable.  Plan for now is to continue oxygen, maintenance diuretic, pulmonary rehab.  No plans for other treatments.    Plan    1) Oxygen   -Room air is fine at rest   -At night use 2 lpm through the bipap   -with exertion - use 4 lpm.  We will send in a prescription for a portable concentrator.    2) Let me know how helpful pulmonary rehab is.  I'm hopeful that this will help quite a bit.    If there are any changes in your breathing.  Especially if it includes significant change in leg swelling or fevers or sputum production.        Total time greater than 40 minutes greater than 50% spent on counseling and coordination of care.  Subjective:    Patient ID: Aydin Knutson Jr. is a 71 y.o. male.    HPI Comments: He is here in follow up.    Chronic hypoxic respiratory failure - still ok on room air but with significant dyspnea and hypoxia with exertion.  He continue to use his oxygen with activity.  4 lpm on demand.  He is waiting for his concentrator from [facility].    ILD - stable.  He does inhale some frankincense fumes but doesn't inhale the actual droplets.  He has only been doing this for about 6 months.    SOFI - still using CPAP on a daily basis. Tolerating it well.  No issues with mask or straps.    Dyspnea - still worse with exertion.  However his exercise tolerance has increased with oxygen and he is focusing on getting more activity.    Chronic heart failure - he  continues on a maintenance diuretic - some foot swelling.  Minimal swelling in the ankle.      --- from previous  71M with lung fibrosis and SOFI.    He was hospitalized for heart failure in November.  He was in the hospital for 4 days.  He had a syncopal event on a plane.  After that he improved a bit and then worsened and now has started improving just a little.    Diuresed a lot.    On RA at rest he is usually around 91%.  With minimal activity he drops to mid/low 80s.  Can get to 70s with sig exertion.    Extensive discussion about pathophysiology, background of his illness with Wu and his wife.    Pulepifanio Silver        --- from previous  Here for follow up of lung fibrosis and SOFI.    Breathing doesn't limit his activity it is primarily musculoskeletal issues that slow him down.  He can walk around at work at his own pace unlimited.    Both of his parents had pulmonary fibrosis.    He wears his CPAP every night.  His usage is down a little bit due to waking up at night to urinate.    He is set to have surgery at some point in the next few months.  It will be a right hip redo.    He has some lower extremity edema.  This has been stable since his 30s.    --- from previous  Here for follow up of bipap.  He had a retritration and had his setting decreased.    He wears 6-7 days/week.  He usually wears it more than 4 hours.    He has trouble falling back asleep after waking to use the bathroom.    His cough is persistent.      --- from previous    Having some trouble with his BiPAP mask.  The pressures are too high and keep him awake.  We had considered having a retitration.    His breathing is doing okay.  He still is just a bit limited.  Overall he quantitatively doesn't feel much change over the last several years.    His cough is improved somewhat.  This time of year he does have some upper airway allergic rhinitis type symptoms.  This responded well to Flonase in the past and he's been started on this by Dr. Austin.             Review of Systems negative x 4 systems except for info in HPI: constitutional, respiratory, GI, cardiovascular.  Current Outpatient Prescriptions on File Prior to Visit   Medication Sig Dispense Refill     ALPRAZolam (XANAX) 0.25 MG tablet Take 1 tablet (0.25 mg total) by mouth bedtime as needed. 30 tablet 1     aspirin 81 MG EC tablet Take 81 mg by mouth daily.       atorvastatin (LIPITOR) 80 MG tablet Take 80 mg by mouth bedtime.       bisoprolol (ZEBETA) 5 MG tablet Take 1 tablet (5 mg total) by mouth daily. 30 tablet 11     blood glucose test (GLUCOSE BLOOD) strips Use 1 each As Directed 2 (two) times a day. Accu check Loren and BS checks every morning 100 each 3     blood sugar diagnostic Strp Use 1 strip As Directed 2 times a day at 6:00 am and 4:00 pm. (Patient taking differently: Use 1 strip As Directed daily. ) 100 strip 11     buPROPion (WELLBUTRIN XL) 150 MG 24 hr tablet TAKE 1 TABLET (150 MG TOTAL) BY MOUTH DAILY. 30 tablet 0     co-enzyme Q-10 50 mg capsule Take 50 mg by mouth daily.       diltiazem (DILACOR XR) 240 MG 24 hr capsule Take 240 mg by mouth daily.       furosemide (LASIX) 40 MG tablet Take 1 tablet (40 mg total) by mouth daily. 90 tablet 0     gabapentin (NEURONTIN) 300 MG capsule Take 300 mg by mouth every morning.       glipiZIDE (GLUCOTROL) 10 MG 24 hr tablet Take 1 tablet (10 mg total) by mouth 2 (two) times a day. 90 tablet 3     irbesartan (AVAPRO) 300 MG tablet Take 300 mg by mouth daily.   3     metFORMIN (GLUCOPHAGE) 1000 MG tablet TAKE ONE TABLET BY MOUTH TWICE A DAY WITH MEALS  180 tablet 0     omeprazole (PRILOSEC) 20 MG capsule Take 20 mg by mouth daily.       ONETOUCH ULTRA TEST strips USE 1 STRIP AS DIRECTED 2 TIMES A DAY AT 6:00 AM AND 4:00 PM. 100 each 10     potassium chloride (KLOR-CON) 10 MEQ CR tablet Take 1 tablet (10 mEq total) by mouth daily. 90 tablet 0     No current facility-administered medications on file prior to visit.      Visit Vitals     BP  142/80     Pulse 73     Resp 18     Wt 208 lb (94.3 kg)     SpO2 (!) 89%  Comment: RA     BMI 36.85 kg/m2             Objective:    Physical Exam   Constitutional: He is oriented to person, place, and time. He appears well-developed and well-nourished. No distress.   HENT:   Head: Normocephalic.   Nose: Nose normal.   Eyes: Pupils are equal, round, and reactive to light. Right eye exhibits no discharge. Left eye exhibits no discharge. No scleral icterus.   Cardiovascular: Normal rate and regular rhythm.  Exam reveals no gallop and no friction rub.    No murmur heard.  Pulmonary/Chest: Effort normal. No respiratory distress. He has no wheezes. He has rales.   Musculoskeletal: He exhibits no edema or tenderness.   Neurological: He is alert and oriented to person, place, and time. No cranial nerve deficit.   Skin: Skin is warm and dry. No rash noted. He is not diaphoretic. No erythema. No pallor.   Psychiatric: He has a normal mood and affect. His behavior is normal. Judgment and thought content normal.               Medical History  Active Ambulatory (Non-Hospital) Problems    Diagnosis     Depression with anxiety     Chronic diastolic heart failure     Hypokalemia     ST segment depression     DM - Type 2 diabetes mellitus without complication, without long-term current use of insulin     Edema - long term - venous insufficiency     H/O total hip arthroplasty - right hip - [place] recall - has seen [facility] - watchful waiting - reassess in four months ([facility], 10/6/16)     Arteriosclerosis of coronary artery     Phimosis - has urology follow-up in September     Hypovitaminosis D - [place] Vitamin [place]     GERD (gastroesophageal reflux disease) - acid reflux since he was a teenager     Cataract - on eye exam, July 2015, [facility]     Cough - AM and PM.     Allergic rhinitis - chronic posterior rhinorrhea     AAA - abdominal aortic aneurysm - followed by Dr. Warner      "Carcinoma of the Prostate Gland     BP - high blood pressure     Meralgia paresthetica on the left     Obesity - LOSING weight gradually     Hypertrophic \"Non-obstructive\" cardiomyopathy - Dr. Joy, Senior Consulting Cardiologist - [facility] - March, 2016     Pulmonary fibrosis - possibly familial - both parents had pumonary fibrosis.     Obstructive sleep apnea - uses BiPAP     Hypoxemia     DM - Type 2 - A1c > 7% - **NO DIABETIC RETINOPATHY OR EDEMA - [facility] - 9/2/16**     Hyperlipidemia     Erectile dysfunction     Chronic Reflux Esophagitis     Past Medical History:   Diagnosis Date     AAA - abdominal aortic aneurysm - followed by Dr. Warner 12/27/2014     Arteriosclerosis of coronary artery 7/27/2016     Balanitis - [facility] - April, 2016 ---> [facility] 4/19/2016     CAD (coronary artery disease)      DM II (diabetes mellitus, type II), controlled      GERD (gastroesophageal reflux disease)      HTN (hypertension)      Hyperlipidemia 12/11/2014     Influenza A 12/15/2014     Pneumonia      Prostate cancer      Pulmonary fibrosis      Sleep apnea          Social history reviewed - his oxygen use has improved his level of activity.  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   Allergies  Allergies   Allergen Reactions     Amlodipine Swelling     Amoxicillin      Severe yeast infection and ended up in ER for this along with bleeding from area      Lisinopril Cough     Cough, but **may** have been from lung disease, not the drug.  [facility] - 3/9/15                            Data Review - imaging, labs, and ekgs listed below were reviewed by me.  CXR and CT images  interpreted personally.     Past Labs  Office Visit on 01/30/2017   Component Date Value     WBC 01/30/2017 11.8*     RBC 01/30/2017 4.61      Hemoglobin 01/30/2017 14.0      Hematocrit 01/30/2017 41.2      MCV 01/30/2017 89      MCH 01/30/2017 30.3      MCHC 01/30/2017 33.9      RDW " 01/30/2017 13.7      Platelets 01/30/2017 204      MPV 01/30/2017 7.8    Office Visit on 12/19/2016   Component Date Value     Vitamin D, Total (25-Hyd* 12/19/2016 57.1    Office Visit on 11/16/2016   Component Date Value     Sodium 11/16/2016 137      Potassium 11/16/2016 4.7      Chloride 11/16/2016 98      CO2 11/16/2016 28      Anion Gap, Calculation 11/16/2016 11      Glucose 11/16/2016 241*     Calcium 11/16/2016 9.4      BUN 11/16/2016 18      Creatinine 11/16/2016 1.29      GFR MDRD Af Amer 11/16/2016 >60      GFR MDRD Non Af Amer 11/16/2016 55*   Admission on 11/08/2016, Discharged on 11/11/2016   Component Date Value     Sodium 11/08/2016 137      Potassium 11/08/2016 4.3      Chloride 11/08/2016 101      CO2 11/08/2016 28      Anion Gap, Calculation 11/08/2016 8      Glucose 11/08/2016 210*     Calcium 11/08/2016 9.0      BUN 11/08/2016 18      Creatinine 11/08/2016 1.14      GFR MDRD Af Amer 11/08/2016 >60      GFR MDRD Non Af Amer 11/08/2016 >60      INR 11/08/2016 1.07      PTT 11/08/2016 30      BNP 11/08/2016 1313*     Troponin I 11/08/2016 0.08      Bilirubin, Total 11/08/2016 0.5      Bilirubin, Direct 11/08/2016 0.2      Protein, Total 11/08/2016 6.4      Albumin 11/08/2016 3.6      Alkaline Phosphatase 11/08/2016 65      AST 11/08/2016 23      ALT 11/08/2016 36      WBC 11/08/2016 10.0      RBC 11/08/2016 4.14*     Hemoglobin 11/08/2016 12.3*     Hematocrit 11/08/2016 36.8*     MCV 11/08/2016 89      MCH 11/08/2016 29.8      MCHC 11/08/2016 33.5      RDW 11/08/2016 15.4*     Platelets 11/08/2016 196      MPV 11/08/2016 8.3      Neutrophils % 11/08/2016 80*     Lymphocytes % 11/08/2016 9*     Monocytes % 11/08/2016 11*     Eosinophils % 11/08/2016 1      Basophils % 11/08/2016 0      Neutrophils Absolute 11/08/2016 7.9*     Lymphocytes Absolute 11/08/2016 0.9      Monocytes Absolute 11/08/2016 1.1*     Eosinophils Absolute 11/08/2016 0.1      Basophils Absolute 11/08/2016 0.0      VENTRICULAR RATE  11/08/2016 69      ATRIAL RATE 11/08/2016 69      P-R INTERVAL 11/08/2016 202      QRS DURATION 11/08/2016 90      Q-T INTERVAL 11/08/2016 426      QTC CALCULATION (BEZET) 11/08/2016 456      P Axis 11/08/2016 5      R AXIS 11/08/2016 -22      T AXIS 11/08/2016 -23      MUSE DIAGNOSIS 11/08/2016                      Value:Normal sinus rhythm  ST & T wave abnormality, consider anterior ischemia  Abnormal ECG  When compared with ECG of 16-DEC-2014 00:44,  Premature atrial complexes are no longer Present  T wave inversion more evident in Inferior leads  T wave inversion now evident in Anterior leads  T wave amplitude has increased in Lateral leads  Confirmed by TONYA RYAN MD LOC: ([id number]) on 11/9/2016 10:39:31 AM       Troponin I 11/08/2016 0.07      CK-MB 11/08/2016 2      Glucose, POC 11/08/2016 96      Glucose, POC 11/08/2016 157      Hemoglobin A1c 11/09/2016 7.6*     Sodium 11/09/2016 139      Potassium 11/09/2016 3.9      Chloride 11/09/2016 98      CO2 11/09/2016 32*     Anion Gap, Calculation 11/09/2016 9      Glucose 11/09/2016 155*     BUN 11/09/2016 15      Creatinine 11/09/2016 1.10      GFR MDRD Af Amer 11/09/2016 >60      GFR MDRD Non Af Amer 11/09/2016 >60      Bilirubin, Total 11/09/2016 1.1*     Calcium 11/09/2016 9.1      Protein, Total 11/09/2016 6.8      Albumin 11/09/2016 3.6      Alkaline Phosphatase 11/09/2016 65      AST 11/09/2016 22      ALT 11/09/2016 34      CK-MB 11/09/2016 2      Troponin I 11/09/2016 0.06      Glucose, POC 11/09/2016 148      Glucose, POC 11/09/2016 182      Glucose, POC 11/09/2016 132      Glucose, POC 11/09/2016 165      WBC 11/10/2016 9.4      RBC 11/10/2016 4.69      Hemoglobin 11/10/2016 13.8*     Hematocrit 11/10/2016 42.0      MCV 11/10/2016 90      MCH 11/10/2016 29.4      MCHC 11/10/2016 32.8      RDW 11/10/2016 15.8*     Platelets 11/10/2016 214      MPV 11/10/2016 8.1      Sodium 11/10/2016 139      Potassium 11/10/2016 3.3*     Chloride 11/10/2016 95*      CO2 11/10/2016 32*     Anion Gap, Calculation 11/10/2016 12      Glucose 11/10/2016 138*     Calcium 11/10/2016 9.1      BUN 11/10/2016 19      Creatinine 11/10/2016 1.17      GFR MDRD Af Amer 11/10/2016 >60      GFR MDRD Non Af Amer 11/10/2016 >60      Glucose, POC 11/10/2016 156      Glucose, POC 11/10/2016 205      Glucose, POC 11/10/2016 196      Glucose, POC 11/10/2016 171      Potassium 11/11/2016 4.0      Glucose, POC 11/11/2016 158    Office Visit on 10/26/2016   Component Date Value     BUN 10/26/2016 20      Creatinine 10/26/2016 1.32*     GFR MDRD Af Amer 10/26/2016 >60      GFR MDRD Non Af Amer 10/26/2016 54*     Past Imaging  Ct Chest High Resolution Without Contrast    Result Date: 1/17/2017  CT CHEST HIGH RESOLUTION 1/17/2017 7:02 PM INDICATION: ILD. Possibly familial pulmonary fibrosis. TECHNIQUE: High-resolution chest with supine inspiration. High-resolution entire chest, select expiratory supine high-resolution images, select inspiratory prone high-resolution images, and coronal reconstruction. Dose reduction techniques were used. IV CONTRAST: None COMPARISON: CT for PE dated 11/8/2016. High-resolution chest CT dated 8/17/2016. FINDINGS: LUNGS AND PLEURA: Central airways are patent. No evidence of tracheobronchomalacia. Possible air trapping in the left lower lobe. Again noted is bronchiectasis and mild bronchiolectasis. Subpleural reticular opacities with architectural distortion are noted without a specific upper lung or lower lung predominance. Areas of groundglass are again noted in the lingula. No new areas of groundglass identified. No significant progression of fibrosis from the prior study. Groundglass has not significantly changed. No new pulmonary nodules. MEDIASTINUM: The left heart is enlarged. The main pulmonary artery is mildly enlarged at 32 mm in diameter. There is scattered coronary artery calcification. Normal esophagus. No mediastinal or hilar lymphadenopathy. LIMITED UPPER  ABDOMEN: Negative. MUSCULOSKELETAL: Incidentally noted sternal foramen. There is mild degenerative change..     CONCLUSION: 1.  Unchanged pulmonary fibrosis. Again, familial pulmonary fibrosis is favored given history. Findings are not typical of UIP or NSIP, though these remain in the differential. Minimal scattered areas of groundglass persist and may represent active disease. 2.  Enlarged main pulmonary artery, consistent with pulmonary hypertension. 3.  Enlarged left heart. Coronary artery calcification.          Outside records included from previous visits but not reviewed today    Per notes - formal report not available  Full PFTs: moderate to moderately severe restriction with TLC of 2.87 59% predicted, no obstruction, moderate decrease in DLCO at 43% predicted. There has been drop in TLC and DLCO compared to 2009      Spirometry today: severe restriction with decrease in FVC and FEV1 compared to 11/14: FEV1 1.50 60%, FVC 1.66 49% previously 1.58 and 1.83 respectively, today unable to exhale 6 seconds  Sleep Study  1. Your sleep study has shown that you have Obstructive Sleep Apnea (SOFI).  2. During the study, your technologist was able to place you on PAP treatment and determine a reasonably effective treatment pressure.  3. An order was placed for PAP therapy. The [facility] (E) department will contact you to set-up an appointment so that you can receive your PAP equipment for home use.  4. If you have not heard from the [facility] by 12/20/14, contact them at [phone].  5. Your provider s clinic will be contacting you separately to set up a follow-up visit in 6 to 8 weeks.   6. If you have any questions or problems with your treatment plan, please contact your ordering provider s clinic as the [facility] technologists are not able to discuss the details of your sleep procedure.    **Notice to Medicare Patients: This process may take up to 2 weeks to complete due to Medicare  "regulations. If you have not heard from the [facility] after 1/12/15, please contact the [facility] at [phone].    Titration Polysomnogram Interpretation    Date of read: 12/17/2014       Estimated body mass index is 39.87 kg/(m^2) as calculated from the following:  Height as of this encounter: 5' 3\" (1.6 m).  Weight as of this encounter: 225 lb (102.059 kg).  [place] Score: 7  Neck Circumference (in inches): 16.5    Titration:  1. This study was performed due to previously diagnosed obstructive sleep apnea to evaluate optimal CPAP and Bi-Level PAP S treatment.  2. Sleep medication: self administered  3. Hypopnea Definition: Rule VIII.4.A  4. The total sleep time was: 350.5 minutes. The sleep latency was: 14.4 minutes, which is normal. The REM sleep latency was: 54.5 minutes which was reduced. Sleep efficiency was 69.4 %, which is decreased. The sleep architecture was fragmented with some sleep stage changes and arousals.  5. Sleep archictechture: Stage N1 13.7%, Stage N2 50.4% , Stage N3 14%, Stage R 22%, Stage WASO %.  6. Snoring reported as: soft;eliminated.  7. Respiratory Events: CPAP and Bi-Level PAP S was titrated during the study and resulted in reasonable elimination of apneas, hypopneas, snoring, and oxygen desaturations. The final effective pressure was 22/18 cm/H2O however this pressure was tested for only a brief time at the end of the study. Supine REM was observed at this pressure.  8. Positional data: Lateral AHI 9.7, Lateral RDI 9.7, Lateral TST 72.5 %, Supine AHI 18, Supine RDI 18, Supine TST 27.5 %.  9. The total sleep time with an SpO2 </= 88% was 18.6 minutes.   10. Mask leak was within expected range. Was chinstrap used / was not used no  11. The final effective mask was a [employer] Medium (Simplus) Full-Face mask.    Other:  1. EEG: No epileptiform activity was noted during this recording.  2. EMG: Occasional periodic limb movements were noted with a PLM index of 18.3 and a PLM " arousal index of 1.7.  3. EKG: No significant cardiac arrhythmias were noted.  4. TCM: Mean 58.8 mmHg, Max 65.1 mmHg, baseline 54-55 mmHg.    Recommendations:    1. Suuggest Bi-Level PAP S at a pressure of 22/18 cm/H2O, with heated humidity and close clinical follow-up.  2. Consider elevation of head of bed 15 - 30 degrees. This was not tested during the study.  3. Would suggest optimizing sleep hygiene measures, especially avoiding alcohol and sedatives. Avoiding sleep deprivation would also be beneficial.  4. The patient should avoid dangers of driving while excessively drowsy.  5. Weight management.    I attest that I have conducted an epoch by epoch review of all of the raw data for this sleep study or procedure.    Natividad Silva MD    TTE  CONCLUSION:   1) LVEF 65%. Normal LV size and systolic function. There is basal   septal hypertrophy present measuring 1.9cm, appears consistent   with hypertrophic cardiomyopathy. The mean gradient across the   LVOT is 3mmHg (normal). There is no AVANI present. No regional   wall motion abnormalities.   2) Normal RV size and function.   3) Mild LA dilatation.   4) Trace aortic regurgitation. Mild sclerosis of aortic valve.   5) No prior studies for comparison.     Left Ventricular Ejection Fraction: 65 %       ICD Codes:   Technical Quality:     Patient Vital Signs: Ht HT 63   Ht(in):   Wt (lb):218   BSA 2.10     BP: 110 / 60

## 2021-06-10 NOTE — PROGRESS NOTES
Call from patient.  He is wanting order for POC sent to [facility].  He is currently on list for POC with [facility] (has been approved and they are just waiting for POC to become available).  Patient states he has spoken to Gladis from [facility] and all they need is order and she has a POC there she can give him.    Orders faxed to [facility], ATTN: Glad[facility] per patient request.

## 2021-06-10 NOTE — PROGRESS NOTES
Patient called and wanted all of his paperwork to be sent to [facility] because he was concerned with the time it was taking [facility] to get him his portable oxygen concentrator.  Called [facility] and they stated that as of 04/11/17 they are waiting on the patient to get his paperwork from [facility] in order to help him get a portable oxygen concentrator before May 2017.    According to [facility] the patient was approved and his portable oxygen concentrator was ordered and they were just waiting for the machine to come in.

## 2021-06-10 NOTE — PROGRESS NOTES
"Assessment/Plan:        Diagnoses and all orders for this visit:    Perennial allergic rhinitis, unspecified allergic rhinitis trigger    Interstitial lung disease    Chronic respiratory failure with hypoxia    Complex presentation of what appears to be mild-moderate pulmonary hypertension in setting of known chronic heart failure, SOFI (treated) and ILD.  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  However it has been stable from an imaging standpoint for some time.    Oxygen Plan  At rest 90% - you don't need to be on oxygen if your saturation is 90% or higher.    With activity - 2-5 lpm depending on activity.   Goals: 1) symptom control - shortness of breath, feeling well               2) keep your saturation above 88-90% \"most\" of the time    SOFI  Keep wearing your bipap every night.    Post Nasal Drip  Try starting your neti pot once daily or a few times per week.    We can try a different nasal spray if this isn't helpful.    I will talk with Dr. Olivarez.      Total time greater than 40 minutes greater than 50% spent on counseling and coordination of care.  Subjective:    Patient ID: Aydin Knutson Jr. is a 72 y.o. male.    HPI Comments: Congestion and cough constantly.  Has been going on for years.  Worse in the morning, then improves over the course of the day.  Then worse in the evening.  Localizes to the back of the throat.  He has tried flonase in the past and it wasn't very helpful.    SOFI: wears his CPAP every night.  Usually does ok with it.    Chronic hypoxic respiratory failure - now has his concentrator    Getting ready for more vascular eval. there is some concern for increasing size of aortic aneurysm.    ILD -no bronchitis or pneumonia in the last several months.        --- from previous  He is here in follow up.    Chronic hypoxic respiratory failure - still ok on room air but with significant dyspnea and hypoxia with exertion.  He continue to use his oxygen with activity.  4 " lpm on demand.  He is waiting for his concentrator from [employer].    ILD - stable.  He does inhale some frankincense fumes but doesn't inhale the actual droplets.  He has only been doing this for about 6 months.    SOFI - still using CPAP on a daily basis. Tolerating it well.  No issues with mask or straps.    Dyspnea - still worse with exertion.  However his exercise tolerance has increased with oxygen and he is focusing on getting more activity.    Chronic heart failure - he continues on a maintenance diuretic - some foot swelling.  Minimal swelling in the ankle.      --- from previous  71M with lung fibrosis and SOFI.    He was hospitalized for heart failure in November.  He was in the hospital for 4 days.  He had a syncopal event on a plane.  After that he improved a bit and then worsened and now has started improving just a little.    Diuresed a lot.    On RA at rest he is usually around 91%.  With minimal activity he drops to mid/low 80s.  Can get to 70s with sig exertion.    Extensive discussion about pathophysiology, background of his illness with Wu and his wife.    Gilmer Silver        --- from previous  Here for follow up of lung fibrosis and SOFI.    Breathing doesn't limit his activity it is primarily musculoskeletal issues that slow him down.  He can walk around at work at his own pace unlimited.    Both of his parents had pulmonary fibrosis.    He wears his CPAP every night.  His usage is down a little bit due to waking up at night to urinate.    He is set to have surgery at some point in the next few months.  It will be a right hip redo.    He has some lower extremity edema.  This has been stable since his 30s.    --- from previous  Here for follow up of bipap.  He had a retritration and had his setting decreased.    He wears 6-7 days/week.  He usually wears it more than 4 hours.    He has trouble falling back asleep after waking to use the bathroom.    His cough is persistent.      --- from  previous    Having some trouble with his BiPAP mask.  The pressures are too high and keep him awake.  We had considered having a retitration.    His breathing is doing okay.  He still is just a bit limited.  Overall he quantitatively doesn't feel much change over the last several years.    His cough is improved somewhat.  This time of year he does have some upper airway allergic rhinitis type symptoms.  This responded well to Flonase in the past and he's been started on this by Dr. Austin.            Review of Systems negative x 4 systems except for info in HPI: constitutional, respiratory, GI, cardiovascular.  Current Outpatient Prescriptions on File Prior to Visit   Medication Sig Dispense Refill     ALPRAZolam (XANAX) 0.25 MG tablet Take 1 tablet (0.25 mg total) by mouth bedtime as needed. 30 tablet 1     aspirin 81 MG EC tablet Take 81 mg by mouth daily.       atorvastatin (LIPITOR) 80 MG tablet Take 80 mg by mouth bedtime.       bisoprolol (ZEBETA) 5 MG tablet Take 1 tablet (5 mg total) by mouth daily. 30 tablet 11     blood glucose test (GLUCOSE BLOOD) strips Use 1 each As Directed 2 (two) times a day. Accu check Loren and BS checks every morning 100 each 3     blood sugar diagnostic Strp Use 1 strip As Directed 2 times a day at 6:00 am and 4:00 pm. (Patient taking differently: Use 1 strip As Directed daily. ) 100 strip 11     buPROPion (WELLBUTRIN XL) 150 MG 24 hr tablet TAKE ONE TABLET BY MOUTH ONE TIME DAILY  30 tablet 5     canagliflozin (INVOKANA) 100 mg Tab Take 1 tablet (100 mg total) by mouth daily. Before the 1st meal of the day 30 tablet 5     co-enzyme Q-10 50 mg capsule Take 50 mg by mouth daily.       diltiazem (DILACOR XR) 240 MG 24 hr capsule Take 240 mg by mouth daily.       furosemide (LASIX) 40 MG tablet Take 1 tablet (40 mg total) by mouth daily. 90 tablet 0     gabapentin (NEURONTIN) 300 MG capsule Take 1 capsule (300 mg total) by mouth every morning. 90 capsule 11     glipiZIDE (GLUCOTROL)  10 MG 24 hr tablet Take 1 tablet (10 mg total) by mouth 2 (two) times a day. 90 tablet 3     irbesartan (AVAPRO) 300 MG tablet Take 300 mg by mouth daily.   3     metFORMIN (GLUCOPHAGE) 1000 MG tablet Take 1 tablet (1,000 mg total) by mouth 2 (two) times a day with meals. 180 tablet 0     omeprazole (PRILOSEC) 20 MG capsule Take 20 mg by mouth daily.       ONETOUCH ULTRA TEST strips USE 1 STRIP AS DIRECTED 2 TIMES A DAY AT 6:00 AM AND 4:00 PM. 100 each 10     potassium chloride (KLOR-CON) 10 MEQ CR tablet Take 1 tablet (10 mEq total) by mouth daily. 90 tablet 0     No current facility-administered medications on file prior to visit.      There were no vitals taken for this visit.          Objective:    Physical Exam   Constitutional: He is oriented to person, place, and time. He appears well-developed and well-nourished. No distress.   HENT:   Head: Normocephalic.   Nose: Nose normal.   Eyes: Pupils are equal, round, and reactive to light. Right eye exhibits no discharge. Left eye exhibits no discharge. No scleral icterus.   Cardiovascular: Normal rate and regular rhythm.  Exam reveals no gallop and no friction rub.    No murmur heard.  Pulmonary/Chest: Effort normal. No respiratory distress. He has no wheezes. He has rales.   Musculoskeletal: He exhibits no edema or tenderness.   Neurological: He is alert and oriented to person, place, and time. No cranial nerve deficit.   Skin: Skin is warm and dry. No rash noted. He is not diaphoretic. No erythema. No pallor.   Psychiatric: He has a normal mood and affect. His behavior is normal. Judgment and thought content normal.               Medical History  Active Ambulatory (Non-Hospital) Problems    Diagnosis     Depression with anxiety     Chronic diastolic heart failure     Hypokalemia     ST segment depression     DM - Type 2 diabetes mellitus without complication, without long-term current use of insulin     Edema - long term - venous insufficiency     H/O total hip  "arthroplasty - right hip - [place] recall - has seen [facility] - watchful waiting - reassess in four months ([facility], 10/6/16)     Arteriosclerosis of coronary artery     Phimosis - has urology follow-up in September     Hypovitaminosis D - [place] Vitamin D [place]     GERD (gastroesophageal reflux disease) - acid reflux since he was a teenager     Cataract - on eye exam, July 2015, [facility]     Cough - AM and PM.     Allergic rhinitis - chronic posterior rhinorrhea     AAA - abdominal aortic aneurysm - followed by Dr. Warner     Carcinoma of the Prostate Gland     BP - high blood pressure     Meralgia paresthetica on the left     Obesity - LOSING weight gradually     Hypertrophic \"Non-obstructive\" cardiomyopathy - Dr. Joy, Senior Consulting Cardiologist - [facility] - March, 2016     Pulmonary fibrosis - possibly familial - both parents had pumonary fibrosis.     Obstructive sleep apnea - uses BiPAP     Hypoxemia     DM - Type 2 - A1c > 7% - **NO DIABETIC RETINOPATHY OR EDEMA - . [facility] - 9/2/16**     Hyperlipidemia     Erectile dysfunction     Chronic Reflux Esophagitis     Past Medical History:   Diagnosis Date     AAA - abdominal aortic aneurysm - followed by Dr. Warner 12/27/2014     Arteriosclerosis of coronary artery 7/27/2016     [facility] ---> Lotrisone 4/19/2016     CAD (coronary artery disease)      DM II (diabetes mellitus, type II), controlled      GERD (gastroesophageal reflux disease)      HTN (hypertension)      Hyperlipidemia 12/11/2014     Influenza A 12/15/2014     Pneumonia      Prostate cancer      Pulmonary fibrosis      Sleep apnea          Social history reviewed -still staying active.  His oxygen allows him to go to more social events.  He lives with his wife.     Allergies  Allergies   Allergen Reactions     Amlodipine Swelling     Amoxicillin      Severe yeast infection and ended up in ER for this " along with bleeding from area      Lisinopril Cough     Cough, but **may** have been from lung disease, not the drug.  [facility] - 3/9/15                            Data Review - imaging, labs, and ekgs listed below were reviewed by me.  CXR and CT images  interpreted personally.     Past Labs  Office Visit on 05/11/2017   Component Date Value     Hemoglobin A1c 05/11/2017 7.8*     Sodium 05/11/2017 140      Potassium 05/11/2017 4.5      Chloride 05/11/2017 98      CO2 05/11/2017 28      Anion Gap, Calculation 05/11/2017 14      Glucose 05/11/2017 157*     Calcium 05/11/2017 9.9      BUN 05/11/2017 17      Creatinine 05/11/2017 1.21      GFR MDRD Af Amer 05/11/2017 >60      GFR MDRD Non Af Amer 05/11/2017 59*   Office Visit on 01/30/2017   Component Date Value     WBC 01/30/2017 11.8*     RBC 01/30/2017 4.61      Hemoglobin 01/30/2017 14.0      Hematocrit 01/30/2017 41.2      MCV 01/30/2017 89      MCH 01/30/2017 30.3      MCHC 01/30/2017 33.9      RDW 01/30/2017 13.7      Platelets 01/30/2017 204      MPV 01/30/2017 7.8    Office Visit on 12/19/2016   Component Date Value     Vitamin D, Total (25-Hyd* 12/19/2016 57.1      Past Imaging  Us Abdominal Aorta    Result Date: 5/5/2017  US ABDOMINAL AORTA 5/5/2017 INDICATION: Abdominal aortic aneurysm, without rupture TECHNIQUE: Ultrasound using gray-scale, two-dimensional images. COMPARISON: Multiple previous, most recent 4/11/2016. CT abdomen and pelvis 9/29/2014. FINDINGS: There is a bilobed infrarenal abdominal aortic aneurysm with a more dominant saccular component distally. Moderate anterior mural thrombus distally. MEASUREMENTS: Abdominal Aorta: Proximal: 3.0 cm Mid: 2.3 cm Distal: Bilobed aneurysm measuring 3.7 cm proximally and 4.7 x 4.1 cm in the distal component. Right Common Iliac Artery: 1.8 x 1.7 cm Left Common Iliac Artery: 3.5 x 3.2 cm. Stent in left iliac system at level of aneurysm.     CONCLUSION: 1.  Bilobed infrarenal abdominal aortic aneurysm with  saccular component distally measuring 4.7 x 4.1 cm. Saccular aneurysm has increased by approximately 6 mm since the 4/11/2016 exam. 2.  Left common iliac artery aneurysm measuring 3.5 cm. There is a left iliac stent, not well delineated on this exam. Prior CT of 2014 shows lack of apposition of the presumed stentgraft of the left common iliac artery level. Left common iliac artery aneurysm measured approximately 2.6 cm in 2014.    Cta Abdomen Pelvis    Result Date: 5/12/2017  CTA ABDOMEN PELVIS 5/12/2017 INDICATION: Saccular abdominal aortic aneurysm and left common iliac artery aneurysm. TECHNIQUE: Helical acquisition through the abdomen and pelvis was performed during the arterial phase of contrast enhancement. 2D and 3D reconstructions were performed by the CT technologist. Dose reduction techniques were used. IV CONTRAST: Iohexol (Omni) 75mL COMPARISON: Ultrasound abdominal aorta 05/05/2017 FINDINGS: CTA ABDOMEN/PELVIS: The celiac trunk, SMA and bilateral renal arteries are patent. There is a bilobed infrarenal abdominal aortic aneurysm there is a more focal saccular aneurysm distally. The distal saccular aneurysm measures 51 mm in greatest AP diameter. Moderate anterior mural thrombus. Patent ADELSO. A smaller proximal aneurysmal component measures 37 x 34 mm. There is a 34 mm left common iliac artery aneurysm. There is a pre-existing stentgraft in the midportion of this aneurysm without proximal seal. The distal portion of the graft shows excellent seal within the left external iliac artery. Prior embolization left internal iliac artery. There is a fusiform 17 mm right internal iliac artery aneurysm. The right common and external iliac arteries are widely patent without significant aneurysmal disease. Both common femoral arteries are ectatic. The visualized proximal profunda femoral and superficial femoral arteries are widely patent. LUNG BASES: Emphysematous and fibrotic changes. Groundglass opacities in the  visualized left upper lobe area ABDOMEN AND PELVIS: The liver, gallbladder, spleen, pancreas and adrenal glands are unremarkable. The kidneys are unremarkable. No adenopathy. Fat-containing periumbilical hernia. Bilateral total hip arthroplasty with associated artifact. Normal urinary bladder. Advanced facet arthropathy at the lower lumbar spine.     CONCLUSION: 1.  Bilobed infrarenal abdominal aortic aneurysm with more focal saccular aneurysm in the distal aorta measuring 4.7 cm. 2.  34 mm left common iliac artery aneurysm with free-floating proximal margin of pre-existing stentgraft which extends into the left external iliac artery. Prior embolization left internal iliac artery. 3.  Abdominal aortic and left common iliac artery aneurysmal disease is amenable to endovascular repair. 4.  17 mm fusiform right internal iliac artery aneurysm. 5.  Pulmonary emphysema and fibrotic changes with groundglass opacities left upper lobe. 6.  Fat-containing periumbilical hernia. 7.  Advanced facet arthropathy lower lumbar spine.

## 2021-06-10 NOTE — PROGRESS NOTES
Spoke with Glory at [facility] and she said they are still waiting on the final paperwork from [facility], but they have him scheduled to  a POC from them on 04/26/17.

## 2021-06-10 NOTE — PROGRESS NOTES
Internal Medicine Office Visit  Patient Name: Aydin Knutson Jr.  Patient Age: 72 y.o.  YOB: 1945  MRN: [id number]  ?  Date of Visit: 2017  Reason for Office Visit:   Chief Complaint   Patient presents with     Follow Up     blood sugar having high readings in the morning and thse are fastign for the past 3 eeks. He stated he has not had any diet change at all       Assessment / Plan / Medical Decision Makin. Type 2 diabetes mellitus with complication, without long-term current use of insulin  -Recheck A1c today as it has been enough time for us to get an updated A1c.  He is advised that he is on maximum doses of current diabetic medications.  Discussed alternative medications including insulin.  He would like to avoid insulin if at all possible.  Will start canafliflozin 100 mg daily. He is advised possible SE including hypotension, mycotic infection. Advise follow up in 3 months and to establish care with a new PCP, sooner if blood sugars do not improve  - Glycosylated Hemoglobin A1c  - Basic Metabolic Panel      Health Maintenance Review  Health Maintenance   Topic Date Due     DEPRESSION FOLLOW UP  1945     DIABETES FOLLOW-UP  1945     DIABETES FOOT EXAM  1957     TD 18+ HE  1965     COLONOSCOPY  1965     ZOSTER VACCINE  2007     DIABETES URINE MICROALBUMIN  2017     DIABETES OPHTHALMOLOGY EXAM  2017     FALL RISK ASSESSMENT  10/26/2017     DIABETES HEMOGLOBIN A1C  2017     ADVANCE DIRECTIVES DISCUSSED WITH PATIENT  2021     PNEUMOCOCCAL POLYSACCHARIDE VACCINE AGE 65 AND OVER  Completed     INFLUENZA VACCINE RULE BASED  Completed     PNEUMOCOCCAL CONJUGATE VACCINE FOR ADULTS (PCV13 OR PREVNAR)  Completed         I am having Mr. Knutson start on canagliflozin. I am also having him maintain his blood glucose test, irbesartan, aspirin, atorvastatin, omeprazole, co-enzyme Q-10, diltiazem, ALPRAZolam, ONETOUCH ULTRA TEST, glipiZIDE,  bisoprolol, blood glucose test, buPROPion, gabapentin, metFORMIN, potassium chloride, and furosemide.     HPI:   Encounter Diagnoses   Name Primary?     Type 2 diabetes mellitus with complication, without long-term current use of insulin Yes     Pulmonary fibrosis - possibly familial - both parents had pumonary fibrosis.       Here today with concern regarding diabetic readings.   DM: fasting readings 162- 172. Post prandial readings 160-180.  He states that typically his fasting blood sugar rate numbers range from .  Blood sugar readings have been higher particularly in the past 2-3 weeks but have been gradually increasing.  He does continue a regular walking routine although his ability to exercise and exert himself is limited given his pulmonary status and diagnosis of pulmonary fibrosis..  His diet has not changed significantly.  He denies any polyuria or polydipsia.  No visual changes.    Review of Systems: As noted in HPI    Current Scheduled Meds:  Outpatient Encounter Prescriptions as of 5/11/2017   Medication Sig Dispense Refill     ALPRAZolam (XANAX) 0.25 MG tablet Take 1 tablet (0.25 mg total) by mouth bedtime as needed. 30 tablet 1     aspirin 81 MG EC tablet Take 81 mg by mouth daily.       atorvastatin (LIPITOR) 80 MG tablet Take 80 mg by mouth bedtime.       bisoprolol (ZEBETA) 5 MG tablet Take 1 tablet (5 mg total) by mouth daily. 30 tablet 11     blood glucose test (GLUCOSE BLOOD) strips Use 1 each As Directed 2 (two) times a day. Accu check Loren and BS checks every morning 100 each 3     blood sugar diagnostic Strp Use 1 strip As Directed 2 times a day at 6:00 am and 4:00 pm. (Patient taking differently: Use 1 strip As Directed daily. ) 100 strip 11     buPROPion (WELLBUTRIN XL) 150 MG 24 hr tablet TAKE ONE TABLET BY MOUTH ONE TIME DAILY  30 tablet 5     diltiazem (DILACOR XR) 240 MG 24 hr capsule Take 240 mg by mouth daily.       furosemide (LASIX) 40 MG tablet Take 1 tablet (40 mg total)  by mouth daily. 90 tablet 0     gabapentin (NEURONTIN) 300 MG capsule Take 1 capsule (300 mg total) by mouth every morning. 90 capsule 11     glipiZIDE (GLUCOTROL) 10 MG 24 hr tablet Take 1 tablet (10 mg total) by mouth 2 (two) times a day. 90 tablet 3     irbesartan (AVAPRO) 300 MG tablet Take 300 mg by mouth daily.   3     metFORMIN (GLUCOPHAGE) 1000 MG tablet Take 1 tablet (1,000 mg total) by mouth 2 (two) times a day with meals. 180 tablet 0     omeprazole (PRILOSEC) 20 MG capsule Take 20 mg by mouth daily.       ONETOUCH ULTRA TEST strips USE 1 STRIP AS DIRECTED 2 TIMES A DAY AT 6:00 AM AND 4:00 PM. 100 each 10     potassium chloride (KLOR-CON) 10 MEQ CR tablet Take 1 tablet (10 mEq total) by mouth daily. 90 tablet 0     [DISCONTINUED] potassium chloride (KLOR-CON) 10 MEQ CR tablet TAKE 1 TABLET BY MOUTH ONCE DAILY 90 tablet 0     canagliflozin (INVOKANA) 100 mg Tab Take 1 tablet (100 mg total) by mouth daily. Before the 1st meal of the day 30 tablet 5     co-enzyme Q-10 50 mg capsule Take 50 mg by mouth daily.       [DISCONTINUED] furosemide (LASIX) 40 MG tablet TAKE ONE TABLET BY MOUTH ONE TIME DAILY  90 tablet 0     No facility-administered encounter medications on file as of 5/11/2017.      Past Medical History:   Diagnosis Date     AAA - abdominal aortic aneurysm - followed by Dr. Warner 12/27/2014     Arteriosclerosis of coronary artery 7/27/2016    Overview:  Angioplasty 1995     [facility] - April, 2016 ---> Lotrisone 4/19/2016     CAD (coronary artery disease)     stent times 4     DM II (diabetes mellitus, type II), controlled      GERD (gastroesophageal reflux disease)      HTN (hypertension)      Hyperlipidemia 12/11/2014     Influenza A 12/15/2014     Pneumonia      Prostate cancer      Pulmonary fibrosis      Sleep apnea      Past Surgical History:   Procedure Laterality Date     HERNIA REPAIR       REVISION TOTAL HIP ARTHROPLASTY Bilateral      Social History   Substance Use  Topics     Smoking status: Former Smoker     Packs/day: 0.50     Years: 20.00     Types: Cigarettes     Quit date: 7/11/1986     Smokeless tobacco: Never Used      Comment: Quit nearly 30 years ago      Alcohol use No       Objective / Physical Examination:  Vitals:    05/11/17 1127 05/11/17 1214   BP: 140/90 130/88   Patient Site: Right Arm    Patient Position: Sitting    Cuff Size: Adult Regular    Pulse: 69    SpO2: 95%    Weight: 210 lb (95.3 kg)      Wt Readings from Last 3 Encounters:   05/11/17 210 lb (95.3 kg)   02/27/17 208 lb (94.3 kg)   01/17/17 206 lb (93.4 kg)     Body mass index is 37.2 kg/(m^2).     General Appearance: Alert and oriented, cooperative, affect appropriate, speech clear, in no apparent distress. Wearing portable oxygen   CV: regular rate and rhythm  Pulm: b/l lower lobe rales      Orders Placed This Encounter   Procedures     Glycosylated Hemoglobin A1c     Basic Metabolic Panel   Followup: Return in about 3 months (around 8/11/2017) for Recheck. earlier if needed.    Total time spent with patient was 25 minutes with >50% of time spent in face-to-face counseling regarding the above plan       Danielle Prabhakar CNP  [facility]

## 2021-06-11 NOTE — PROGRESS NOTES
"  Assessment and Plan:   1. Screening for colon cancer  - Ambulatory referral for Colonoscopy    2. AAA - abdominal aortic aneurysm - followed by Dr. Warner  He is followed with serial imaging studies Dr. Jermaine Warner recent aneurysm size 4.7 cm    3. CAD, BMSx4 1998  Secondary prevention with aspirin and statin, is not smoking, excellent diabetes control recently started Invokana  - HM2(CBC w/o Differential)    4. Encounter for general adult medical examination with abnormal findings    5. DM - Type 2 diabetes mellitus without complication, without long-term current use of insulin  Okay control, we will check A1c today, continue 3 oral medications, consideration of once weekly injectable, five-day glucose monitor to understand when he is having high and/or low blood sugar  - Comprehensive Metabolic Panel  - Lipid Cascade  - Glycosylated Hemoglobin A1c  - Ambulatory referral to Diabetes Education (Existing Diagnosis)    6. Depression with anxiety  Well-controlled on Wellbutrin and a rare Xanax    7. Cough - AM and PM.  Probably mostly related to his pulmonary fibrosis, will try Atrovent nasal spray    8. Gastroesophageal reflux disease without esophagitis  Continue PPI    9. Hypertrophic \"Non-obstructive\" cardiomyopathy - Dr. Joy  Management per cardiology, appears to be euvolemic at this point on beta-blocker, ARB and furosemide.  He is having some foot swelling early in the morning and I think this is somewhat complicated by his peripheral neuropathy, underlying obstructive sleep apnea may be contributing as well as pulmonary fibrosis.  He has been seen by specialist regarding advanced treatments for right heart failure and was not a candidate.  I am not inclined to increase diuretics at this time.  Consideration of spironolactone if additional diuretic is warranted, he can follow-up with Dr. Joy as well    10. SOFI - uses BiPAP    11. Pulmonary fibrosis - Dr. Bird  Supplemental oxygen with exertion    12. " Mixed hyperlipidemia    13. Advanced care planning/counseling discussion  A total of 16 minutes was bent discussing his health care directive which she has but we do not have on file.  He will bring it in for us to scan.  He is full code, his medical decision-maker be his wife as well as Fernanda Hurt, his sister-in-law.  He would want treatment for reversible conditions, okay with short-term intubation, short-term dialysis, short-term feeding tube    D, BMSx4 1998    The patient's current medical problems were reviewed.    I have had an Advance Directives discussion with the patient.  The following high BMI interventions were performed this visit: encouragement to exercise and lifestyle education regarding diet  The following health maintenance schedule was reviewed with the patient and provided in printed form in the after visit summary:   Health Maintenance   Topic Date Due     DEPRESSION FOLLOW UP  1945     TD 18+ HE  04/19/1965     COLONOSCOPY  04/19/1965     ZOSTER VACCINE  04/19/2007     DIABETES URINE MICROALBUMIN  07/27/2017     INFLUENZA VACCINE RULE BASED (1) 08/01/2017     DIABETES OPHTHALMOLOGY EXAM  09/02/2017     FALL RISK ASSESSMENT  10/26/2017     DIABETES HEMOGLOBIN A1C  11/11/2017     DIABETES FOLLOW-UP  01/07/2018     DIABETES FOOT EXAM  07/07/2018     ADVANCE DIRECTIVES DISCUSSED WITH PATIENT  07/27/2021     PNEUMOCOCCAL POLYSACCHARIDE VACCINE AGE 65 AND OVER  Completed     PNEUMOCOCCAL CONJUGATE VACCINE FOR ADULTS (PCV13 OR PREVNAR)  Completed        Subjective:   Chief Complaint: Aydin Knutson  is an 72 y.o. male here for an Annual Wellness visit.   HPI: 72-year-old man comes in to [facility], for annual wellness visit and evaluation of numerous medical problems.  His medical history was reviewed, the electronic medical record is updated and it is reflected in this note.  He has underlying diastolic congestive heart failure with hypertrophic cardiomyopathy without obstruction.  He is  generally done well with the exception of a hospitalization after some dietary indiscretion on a trip.  He has been on oxygen since that episode.  He did see advanced heart failure specialist at [facility] regarding advanced treatments for pulmonary hypertension.  Given his underlying hypertrophic cardiomyopathy he was not felt to be a candidate for vasodilators.  Additionally he was not felt to be a candidate for advanced heart failure treatment mechanically because of his underlying interstitial lung disease.  He does see Dr. Noel Crowell in for interstitial lung disease and is on supplemental oxygen with exertion.  He does have underlying gastroesophageal reflux disease.  Both of his parents have pulmonary fibrosis.  He has underlying diabetes and is on 3 oral medications.  He recently started Invokana.  His fasting blood sugars have been between 101 60.  His last hemoglobin A1c was 7.8%.  1 of his main complaints today is that he actually has worsened swelling in his feet in the morning and pain associated with this.  He does have some peripheral neuropathy and takes gabapentin 300 mg in the morning.  He does not have significant pain or swelling in his feet during the day.  He has significant peripheral vascular disease including a AAA which is followed by Dr. Jermaine Hanks, 4.7 cm as well as iliac aneurysm status post resection of the left iliac aneurysm and stenting.  He has no claudication.  Has significant postnasal drip and cough.  This is pronounced in the morning.    Review of Systems:   Please see above.  The rest of the review of systems are negative for all systems.    Patient Care Team:  Ramon Burns MD as PCP - General (Internal Medicine)  Tanisha Roman RN as Registered Nurse     Patient Active Problem List   Diagnosis     Chronic Reflux Esophagitis     Hyperlipidemia     Erectile dysfunction     Pulmonary fibrosis - Dr. Bird     SOFI - uses BiPAP     AAA -  "abdominal aortic aneurysm - followed by Dr. Warner     Prostate Ca, s/p resection     Obesity     Hypertrophic \"Non-obstructive\" cardiomyopathy - Dr. Joy     Allergic rhinitis - chronic posterior rhinorrhea     Cough - AM and PM.     Cataract - on eye exam, July 2015, [facility]     GERD      CAD, BMSx4 1998     Phimosis - has urology follow-up in September     H/O total hip arthroplasty - right hip - [place] recall - has seen [facility] - watchful waiting - reassess in four months (TWB, 10/6/16)     Edema - long term - venous insufficiency     DM - Type 2 diabetes mellitus without complication, without long-term current use of insulin     Chronic diastolic heart failure     Depression with anxiety     Past Medical History:   Diagnosis Date     AAA - abdominal aortic aneurysm - followed by Dr. Warner 12/27/2014     Arteriosclerosis of coronary artery 7/27/2016    Overview:  [facility] 1995     [facility] ---> [facility] 4/19/2016     CAD (coronary artery disease)     stent times 4     DM II (diabetes mellitus, type II), controlled      GERD (gastroesophageal reflux disease)      HTN (hypertension)      Hyperlipidemia 12/11/2014     Influenza A 12/15/2014     Pneumonia      Prostate cancer      Pulmonary fibrosis      Sleep apnea       Past Surgical History:   Procedure Laterality Date     CORONARY STENT PLACEMENT  1998    x4     ILIAC ARTERY ANEURYSM REPAIR Left      ILIAC ARTERY STENT Left      PROSTATECTOMY      radical, [place]     REVISION TOTAL HIP ARTHROPLASTY Bilateral     Left x2, Right x1     TONSILLECTOMY       UMBILICAL HERNIA REPAIR        Family History   Problem Relation Age of Onset     Pulmonary fibrosis Mother      Depression Mother      Pulmonary fibrosis Father      Depression Father      Depression Sister      Diabetes Sister      Pulmonary embolism Sister      Depression Brother      No Medical Problems Daughter      No Medical Problems Son      Depression " Brother      Depression Brother      Depression Sister      Depression Sister       Social History     Social History     Marital status:      Spouse name: N/A     Number of children: N/A     Years of education: N/A     Occupational History     Not on file.     Social History Main Topics     Smoking status: Former Smoker     Packs/day: 0.50     Years: 20.00     Types: Cigarettes     Quit date: 7/11/1986     Smokeless tobacco: Never Used     Alcohol use Yes      Comment: rare     Drug use: No     Sexual activity: Not Currently     Partners: Female     Other Topics Concern     Not on file     Social History Narrative    Lives with his wife, Inez Solano here at [facility].  [facility].        Current Outpatient Prescriptions   Medication Sig Dispense Refill     ALPRAZolam (XANAX) 0.25 MG tablet Take 1 tablet (0.25 mg total) by mouth bedtime as needed. 30 tablet 1     aspirin 81 MG EC tablet Take 81 mg by mouth daily.       atorvastatin (LIPITOR) 80 MG tablet Take 80 mg by mouth bedtime.       bisoprolol (ZEBETA) 5 MG tablet Take 1 tablet (5 mg total) by mouth daily. 30 tablet 11     blood glucose test (GLUCOSE BLOOD) strips Use 1 each As Directed 2 (two) times a day. Accu check Loren and BS checks every morning 100 each 3     blood sugar diagnostic Strp Use 1 strip As Directed 2 times a day at 6:00 am and 4:00 pm. (Patient taking differently: Use 1 strip As Directed daily. ) 100 strip 11     buPROPion (WELLBUTRIN XL) 150 MG 24 hr tablet TAKE ONE TABLET BY MOUTH ONE TIME DAILY  30 tablet 5     canagliflozin (INVOKANA) 100 mg Tab Take 1 tablet (100 mg total) by mouth daily. Before the 1st meal of the day 30 tablet 5     co-enzyme Q-10 50 mg capsule Take 50 mg by mouth daily.       diltiazem (DILACOR XR) 240 MG 24 hr capsule Take 240 mg by mouth daily.       furosemide (LASIX) 40 MG tablet Take 1 tablet (40 mg total) by mouth daily. 90 tablet 0     gabapentin (NEURONTIN) 300 MG capsule Take 1 capsule (300  "mg total) by mouth every morning. 90 capsule 11     glipiZIDE (GLUCOTROL) 10 MG 24 hr tablet Take 1 tablet (10 mg total) by mouth 2 (two) times a day. 90 tablet 3     irbesartan (AVAPRO) 300 MG tablet Take 300 mg by mouth daily.   3     metFORMIN (GLUCOPHAGE) 1000 MG tablet Take 1 tablet (1,000 mg total) by mouth 2 (two) times a day with meals. 180 tablet 0     omeprazole (PRILOSEC) 20 MG capsule Take 20 mg by mouth daily.       ONETOUCH ULTRA TEST strips USE 1 STRIP AS DIRECTED 2 TIMES A DAY AT 6:00 AM AND 4:00 PM. 100 each 10     potassium chloride (KLOR-CON) 10 MEQ CR tablet Take 1 tablet (10 mEq total) by mouth daily. 90 tablet 0     No current facility-administered medications for this visit.       Objective:   Vital Signs:   Visit Vitals     /86 (Patient Site: Right Arm, Patient Position: Sitting, Cuff Size: Adult Regular)     Pulse 75     Ht 5' 3\" (1.6 m)     Wt 209 lb (94.8 kg)     SpO2 91%     BMI 37.02 kg/m2        VisionScreening:  No exam data present     PHYSICAL EXAM  EYES: Eyelids, conjunctiva, and sclera were normal. Pupils were normal. Cornea, iris, and lens were normal bilaterally.  HEAD, EARS, NOSE, MOUTH, AND THROAT: Head and face were normal. Hearing was normal to voice and the ears were normal to external exam. Nose appearance was normal and there was no discharge. Oropharynx was normal.  NECK: Neck appearance was normal. There were no neck masses and the thyroid was not enlarged.  RESPIRATORY: Breathing pattern was normal and the chest moved symmetrically.  Percussion/auscultatory percussion was normal.  Lung sounds are menstruated occasional fine crackle   CARDIOVASCULAR: Heart rate and rhythm were normal.  S1 and S2 were normal and there were no extra sounds or murmurs. Peripheral pulses in arms and legs were normal.  Jugular venous pressure was normal.  There was no peripheral edema.  GASTROINTESTINAL: The abdomen was obese in contour.  Bowel sounds were present.  Percussion detected " no organ enlargement or tenderness.  Palpation detected no tenderness, mass, or enlarged organs.   MUSCULOSKELETAL: Skeletal configuration was normal and muscle mass was normal for age. Joint appearance was overall normal.  LYMPHATIC: There were no enlarged nodes.  SKIN/HAIR/NAILS: Skin color was normal.  There were no skin lesions.  Hair and nails were normal.  NEUROLOGIC: The patient was alert and oriented to person, place, time, and circumstance. Speech was normal. Cranial nerves were normal. Motor strength was normal for age. The patient was normally coordinated.  PSYCHIATRIC:  Mood and affect were normal and the patient had normal recent and remote memory. The patient's judgment and insight were normal.    Assessment Results 7/7/2017   Activities of Daily Living No help needed   Instrumental Activities of Daily Living No help needed   Get Up and Go Score Less than 12 seconds   Mini Cog Total Score 5     A Mini-Cog score of 0-2 suggests the possibility of dementia, score of 3-5 suggests no dementia    Identified Health Risks:     He is at risk for falling and has been provided with information to reduce the risk of falling at home.  Patient's advanced directive was discussed and I am comfortable with the patient's wishes.

## 2021-06-12 NOTE — PROGRESS NOTES
"Assessment:   Wu arrived today for ipro interpretation. Results were downloaded and scanned into chart for review.   66 % of results were above target range with 1 % below 100 mg/dl overnight.  Pt has not had sx of hypoglycemia.  After consult with Dr Burns and upon further review, it appears that there is a marked elevations in postprandial breakfast BG.  Pt will include 90 GMS of CHO at breakfast, and no more than 75 GMS is recommended.  Pt also snacks on high CHO foods.   Provided placemat and discussed sources of CHO and portion sizes.   Discussed limiting CHO foods at meals and snacks.  Pt stated he is on a low sodium diet of 2000 mg.  Provided low sodium snack ideas as well as sodium content of foods from the Academy of Nutrition and dietetics.  Also looked up some of the foods he typically eats when out at [employer] which are high sodium.    Also noted pt is not including non-starchy veg at most meals.  Pt stated he typically does include them.    Discussed staying hydrated with Invokana.  Pt stated he restricts fluids to 64 oz due to \"fluid retention.\"  Briefly discussed calling PCP if sx of nausea or vomiting present.    Briefly discussed starting GLP1.  GLP1 and SGLT2 are recommended by the 2017 AACE guidelines and are not contraindicated.  Pt prefers to watch CHO and make positive lifestyle changes and f/u with Dr Burns at scheduled appt in Oct.        Past note:  Used to be a pt of Dr Austin and went to [place] with Dr Riley.  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  He has had diabetes for 30 years with strong family hx.          Plan: see goals    Subjective and Objective:      Aydin Knutson Jr. is referred by Dr Burns for Diabetes Education.     Lab Results   Component Value Date    HGBA1C 7.7 (H) 07/07/2017         Current diabetes medications:    Metformin 1000 mg bid  Glipizide XL 10 mg bid  Invokana 100 mg QAM    Goals       nutrition            " Limit CHO at meals and snacks  Include protein and non-starchy veg at each meal.            Follow up:   MNT (medical nutrition therapy)      Education:     Monitoring   Meter (per above goals): Assessed and Competent  Monitoring: Assessed  BG goals: Assessed and Discussed    Nutrition Management  Nutrition Management: Assessed, Discussed and Literature provided  Weight: Assessed  Portions/Balance: Assessed, Discussed and Literature provided  Carb ID/Count: Assessed, Discussed and Literature provided  Label Reading: Needs instruction/review at follow-up  Heart Healthy Fats: Needs instruction/review at follow-up  Menu Planning: Needs instruction/review at follow-up  Dining Out: Assessed and Discussed-Calorie lizbet  Physical Activity: Assessed and Discussed-cannot have O2 < 90 %  Medications: Needs instruction/review at follow-up  Orals: Needs instruction/review at follow-up  Injected Medications: Not addressed   Storage/Exp:Not addressed   Site Rotation: Not addressed   Sites Assessed: no    Diabetes Disease Process: Assessed    Acute Complications: Prevent, Detect, Treat:  Hypoglycemia: Assessed and Discussed  Hyperglycemia: Assessed and Discussed  Sick Days: Needs instruction/review at follow-up  Driving: Not addressed    Chronic Complications  Foot Care:Needs instruction/review at follow-up  Skin Care: Needs instruction/review at follow-up  Eye: Needs instruction/review at follow-up  ABC: Needs instruction/review at follow-up  Teeth:Needs instruction/review at follow-up  Goal Setting and Problem Solving: Needs instruction/review at follow-up  Barriers: Needs instruction/review at follow-up  Psychosocial Adjustments: Needs instruction/review at follow-up      Time spent with the patient: 60 minutes for diabetes education and counseling.   Previous Education: no  Visit Type:MNT  Hours Remaining: DSMT 1 and MNT 1      Aisha Gomez RD, LD, CDE  8/23/2017

## 2021-06-12 NOTE — PROGRESS NOTES
"Assessment:   Wu arrived today without his BG log or meter.  He stated he is testing FBG most morings with results \"< 130\" mg/dl.    Per Dr Burns's orders, on 7/7/17,placed  iPro sensor on  left  Side of abdomen above the belt line.  Secured with two pieces of over tape and Tegaderm.    Demonstrated Anupam Contour EZ meter and pt returned demonstration.  Instructed pt to use Anupam meter while wearing iPro and test four times per day:  FBG, Pre-lunch, Pre-dinner and HS.  Provided copies of food, BG and medication records for pt to write detailed information for five days.      Used to be a pt of Dr Austin and went to [facility] with Dr Riley.  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  He has had diabetes for 30 years with strong family hx.          Plan: see goals    Subjective and Objective:      Aydin Knutson Jr. is referred by Dr Burns for Diabetes Education.     Lab Results   Component Value Date    HGBA1C 7.7 (H) 07/07/2017         Current diabetes medications:    Metformin 1000 mg bid  Glipizide XL 10 mg bid  Invokana 100 mg QAM    Goals       goals            Check blood sugars 4X/day while wearing the sensor.   Record food, activity, and hypoglycemia during the next 5 days.   Take and record all medications as prescribed.  Return the sensor on 8/14/17                Follow up:   MNT (medical nutrition therapy)      Education:     Monitoring   Meter (per above goals): Assessed, Discussed, Literature provided and Patient returned demonstration  Monitoring: Assessed, Discussed and Literature provided  BG goals: Assessed, Discussed and Literature provided    Nutrition Management  Nutrition Management: Needs instruction/review at follow-up  Weight: Needs instruction/review at follow-up  Portions/Balance: Needs instruction/review at follow-up  Carb ID/Count: Needs instruction/review at follow-up  Label Reading: Needs instruction/review at follow-up  Heart Healthy " Fats: Needs instruction/review at follow-up  Menu Planning: Needs instruction/review at follow-up  Dining Out: Needs instruction/review at follow-up  Physical Activity: Needs instruction/review at follow-up  Medications: Needs instruction/review at follow-up  Orals: Needs instruction/review at follow-up  Injected Medications: Not addressed   Storage/Exp:Not addressed   Site Rotation: Not addressed   Sites Assessed: no    Diabetes Disease Process: Assessed    Acute Complications: Prevent, Detect, Treat:  Hypoglycemia: Assessed, Discussed and Literature provided  Hyperglycemia: Assessed, Discussed and Literature provided  Sick Days: Needs instruction/review at follow-up  Driving: Not addressed    Chronic Complications  Foot Care:Needs instruction/review at follow-up  Skin Care: Needs instruction/review at follow-up  Eye: Needs instruction/review at follow-up  ABC: Needs instruction/review at follow-up  Teeth:Needs instruction/review at follow-up  Goal Setting and Problem Solving: Needs instruction/review at follow-up  Barriers: Needs instruction/review at follow-up  Psychosocial Adjustments: Needs instruction/review at follow-up      Time spent with the patient: 60 minutes for diabetes education and counseling.   Previous Education: no  Visit Type:DSMT  Hours Remaining: DSMT 1 and MNT 2      Aisha Gomez RD, LD, CDE  8/8/2017

## 2021-06-13 NOTE — PROGRESS NOTES
"Assessment/Plan:        Diagnoses and all orders for this visit:    SOFI - uses BiPAP    Interstitial lung disease    Chronic respiratory failure with hypoxia    Post-nasal drip    Complex presentation of what appears to be mild-moderate pulmonary hypertension in setting of known chronic heart failure, SOFI (treated) and ILD-most likely NSIP.  In the setting of recent decreased air qualities had increased sinus congestion.  This has worsened his breathing and led him to need a bit more oxygen be been more of breath.  This is consistent with issues previously.  This change is not severe but has been persistent.  We will see how he does over the next few weeks and he can take over-the-counter fluticasone if he needs.  If he continues to get worse we will put him on a low-dose of prednisone which has helped him before.  If we go forward with this I will update his internist as well because of the risk of hyperglycemia.      Oxygen Plan  At rest 90% - you don't need to be on oxygen if your saturation is 90% or higher.    With activity - 2-5 lpm depending on activity.   Goals: 1) symptom control - shortness of breath, feeling well               2) keep your saturation above 88-90% \"most\" of the time    SOFI  Keep wearing your bipap every night.  We will send your chip back after review.    Post Nasal Drip  If persistent, try OTC fluticasone or similar. Alternatively you can call in and we can do a prescription version.    If your symptoms worsen, we will try a low dose, short course of prednisone.    Continue your atrovent nasal spray.    Kidney Procedure  I'm not aware of any issue for your kidney procedure and your lungs beyond the usual issues for conscious sedation for patients with lung disease.    Subjective:    Patient ID: Aydin Knutson  is a 72 y.o. male.    HPI Comments: 9-year-old man with ILD and likely pulmonary hypertension, sleep apnea here for follow-up.  Congestion has worsened a bit in the last few " weeks.  Seems to be associated with pollen and the smoke in the air.  Symptoms localized to the nose and throat.  Previously his symptoms have been helped by prednisone.  His current nasal sprays have not stopped it.  Pertinent positives include throat clearing.  Pertinent negatives include no hemoptysis, no fevers.    Chronic hypoxic respiratory failure - seems to have worsened recently.  Using a bit more oxygen.  Still 90% at rest.  Using it with activity.    BiPAP - having more trouble due to his congestion.  He does wear it every day.  It makes his sleep much more healthy.    -- from previous  Congestion and cough constantly.  Has been going on for years.  Worse in the morning, then improves over the course of the day.  Then worse in the evening.  Localizes to the back of the throat.  He has tried flonase in the past and it wasn't very helpful.    SOFI: wears his CPAP every night.  Usually does ok with it.    Chronic hypoxic respiratory failure - now has his concentrator    Getting ready for more vascular eval. there is some concern for increasing size of aortic aneurysm.    ILD -no bronchitis or pneumonia in the last several months.        --- from previous  He is here in follow up.    Chronic hypoxic respiratory failure - still ok on room air but with significant dyspnea and hypoxia with exertion.  He continue to use his oxygen with activity.  4 lpm on demand.  He is waiting for his concentrator from [employer].    ILD - stable.  He does inhale some frankincense fumes but doesn't inhale the actual droplets.  He has only been doing this for about 6 months.    SOFI - still using CPAP on a daily basis. Tolerating it well.  No issues with mask or straps.    Dyspnea - still worse with exertion.  However his exercise tolerance has increased with oxygen and he is focusing on getting more activity.    Chronic heart failure - he continues on a maintenance diuretic - some foot swelling.  Minimal swelling in the  ankle.      --- from previous  71M with lung fibrosis and SOFI.    He was hospitalized for heart failure in November.  He was in the hospital for 4 days.  He had a syncopal event on a plane.  After that he improved a bit and then worsened and now has started improving just a little.    Diuresed a lot.    On RA at rest he is usually around 91%.  With minimal activity he drops to mid/low 80s.  Can get to 70s with sig exertion.    Extensive discussion about pathophysiology, background of his illness with Wu and his wife.    Pulepifanio Silver        --- from previous  Here for follow up of lung fibrosis and SOFI.    Breathing doesn't limit his activity it is primarily musculoskeletal issues that slow him down.  He can walk around at work at his own pace unlimited.    Both of his parents had pulmonary fibrosis.    He wears his CPAP every night.  His usage is down a little bit due to waking up at night to urinate.    He is set to have surgery at some point in the next few months.  It will be a right hip redo.    He has some lower extremity edema.  This has been stable since his 30s.    --- from previous  Here for follow up of bipap.  He had a retritration and had his setting decreased.    He wears 6-7 days/week.  He usually wears it more than 4 hours.    He has trouble falling back asleep after waking to use the bathroom.    His cough is persistent.      --- from previous    Having some trouble with his BiPAP mask.  The pressures are too high and keep him awake.  We had considered having a retitration.    His breathing is doing okay.  He still is just a bit limited.  Overall he quantitatively doesn't feel much change over the last several years.    His cough is improved somewhat.  This time of year he does have some upper airway allergic rhinitis type symptoms.  This responded well to Flonase in the past and he's been started on this by Dr. Austin.            Review of Systems positive for nasal discharge sneezing tinnitus leg  swelling.  The remainder of a 14 system review systems is negative.  Current Outpatient Prescriptions on File Prior to Visit   Medication Sig Dispense Refill     ALPRAZolam (XANAX) 0.25 MG tablet Take 1 tablet (0.25 mg total) by mouth bedtime as needed. 30 tablet 1     aspirin 81 MG EC tablet Take 81 mg by mouth daily.       atorvastatin (LIPITOR) 80 MG tablet Take 80 mg by mouth bedtime.       bisoprolol (ZEBETA) 5 MG tablet Take 1 tablet (5 mg total) by mouth daily. 90 tablet 0     blood glucose test (GLUCOSE BLOOD) strips Use 1 each As Directed 2 (two) times a day. Accu check Loren and BS checks every morning 100 each 3     blood sugar diagnostic Strp Use 1 strip As Directed 2 times a day at 6:00 am and 4:00 pm. (Patient taking differently: Use 1 strip As Directed daily. ) 100 strip 11     buPROPion (WELLBUTRIN XL) 150 MG 24 hr tablet TAKE ONE TABLET BY MOUTH ONE TIME DAILY  30 tablet 2     canagliflozin (INVOKANA) 100 mg Tab Take 1 tablet (100 mg total) by mouth daily. Before the 1st meal of the day 30 tablet 5     co-enzyme Q-10 50 mg capsule Take 50 mg by mouth daily.       diltiazem (DILACOR XR) 240 MG 24 hr capsule Take 240 mg by mouth daily.       furosemide (LASIX) 40 MG tablet Take 1 tablet (40 mg total) by mouth daily. (Patient taking differently: Take 40 mg by mouth 2 (two) times a day at 9am and 6pm. ) 90 tablet 0     gabapentin (NEURONTIN) 300 MG capsule Take 1 capsule (300 mg total) by mouth 2 (two) times a day. 180 capsule 11     glipiZIDE (GLUCOTROL XL) 10 MG 24 hr tablet TAKE ONE TABLET BY MOUTH TWICE DAILY  90 tablet 2     ipratropium (ATROVENT) 0.03 % nasal spray 2 sprays into each nostril 3 (three) times a day. 30 mL 2     irbesartan (AVAPRO) 300 MG tablet Take 300 mg by mouth daily.   3     KLOR-CON M10 10 mEq tablet TAKE ONE TABLET BY MOUTH ONE TIME DAILY  90 tablet 3     metFORMIN (GLUCOPHAGE) 1000 MG tablet TAKE ONE TABLET BY MOUTH TWICE DAILY WITH MEALS 180 tablet 0     omeprazole  (PRILOSEC) 20 MG capsule Take 20 mg by mouth daily.       ONETOUCH ULTRA TEST strips USE 1 STRIP AS DIRECTED 2 TIMES A DAY AT 6:00 AM AND 4:00 PM. 100 each 10     [DISCONTINUED] atorvastatin (LIPITOR) 80 MG tablet take 1 tablet by mouth daily  30 tablet 7     No current facility-administered medications on file prior to visit.      /68  Pulse 84  Resp 20  Wt 210 lb (95.3 kg) Comment: patient reported weight  SpO2 (!) 88% Comment: RA  BMI 37.2 kg/m2          Objective:    Physical Exam   Constitutional: He is oriented to person, place, and time. He appears well-developed and well-nourished. No distress.   HENT:   Head: Normocephalic.   Nose: Nose normal.   Eyes: Pupils are equal, round, and reactive to light. Right eye exhibits no discharge. Left eye exhibits no discharge. No scleral icterus.   Cardiovascular: Normal rate and regular rhythm.  Exam reveals no gallop and no friction rub.    No murmur heard.  Pulmonary/Chest: Effort normal. No respiratory distress. He has no wheezes. He has rales.   Musculoskeletal: He exhibits no edema or tenderness.   Neurological: He is alert and oriented to person, place, and time. No cranial nerve deficit.   Skin: Skin is warm and dry. No rash noted. He is not diaphoretic. No erythema. No pallor.   Psychiatric: He has a normal mood and affect. His behavior is normal. Judgment and thought content normal.               Medical History  Active Ambulatory (Non-Hospital) Problems    Diagnosis     Depression with anxiety     Chronic diastolic heart failure     DM - Type 2 diabetes mellitus without complication, without long-term current use of insulin     Edema - long term - venous insufficiency     H/O total hip arthroplasty - right hip - Eryn recall - has seen [facility] - watchful waiting - reassess in four months (TWB, 10/6/16)     CAD, BMSx4 1998     Phimosis - has urology follow-up in September     GERD      Cataract - on eye exam, July 2015, [facility]     Cough  "- AM and PM.     Allergic rhinitis - chronic posterior rhinorrhea     AAA - abdominal aortic aneurysm - followed by Dr. Warner     Prostate Ca, s/p resection     Obesity     Hypertrophic \"Non-obstructive\" cardiomyopathy - Dr. Joy     Pulmonary fibrosis - Dr. Bird     SOFI - uses BiPAP     Hyperlipidemia     Erectile dysfunction     Past Medical History:   Diagnosis Date     AAA - abdominal aortic aneurysm - followed by Dr. Warner 12/27/2014     Arteriosclerosis of coronary artery 7/27/2016     Balanitis - [facility] - April, 2016 ---> Lotrisone 4/19/2016     CAD (coronary artery disease)      DM II (diabetes mellitus, type II), controlled      GERD (gastroesophageal reflux disease)      HTN (hypertension)      Hyperlipidemia 12/11/2014     Influenza A 12/15/2014     Pneumonia      Prostate cancer      Pulmonary fibrosis      Sleep apnea          Social history reviewed -still staying active.  He works in the hospital.  He has a trip planned [place] coming up.   Allergies  Allergies   Allergen Reactions     Amlodipine Swelling     Amoxicillin      Severe yeast infection and ended up in ER for this along with bleeding from area      Lisinopril Cough     Cough, but **may** have been from lung disease, not the drug.  TBrinkMD - 3/9/15                            Data Review - imaging, labs, and ekgs listed below were reviewed by me.  CXR and CT images  interpreted personally.     Past Labs  Physical on 07/07/2017   Component Date Value     WBC 07/07/2017 8.7      RBC 07/07/2017 4.64      Hemoglobin 07/07/2017 14.1      Hematocrit 07/07/2017 42.5      MCV 07/07/2017 92      MCH 07/07/2017 30.4      MCHC 07/07/2017 33.2      RDW 07/07/2017 14.6*     Platelets 07/07/2017 221      MPV 07/07/2017 11.5      Sodium 07/07/2017 141      Potassium 07/07/2017 4.1      Chloride 07/07/2017 101      CO2 07/07/2017 27      Anion Gap, Calculation 07/07/2017 13      Glucose 07/07/2017 130*     BUN 07/07/2017 19      Creatinine " 07/07/2017 1.27      GFR MDRD Af Amer 07/07/2017 >60      GFR MDRD Non Af Amer 07/07/2017 56*     Bilirubin, Total 07/07/2017 0.6      Calcium 07/07/2017 9.8      Protein, Total 07/07/2017 7.8      Albumin 07/07/2017 4.0      Alkaline Phosphatase 07/07/2017 85      AST 07/07/2017 20      ALT 07/07/2017 19      Cholesterol 07/07/2017 161      Triglycerides 07/07/2017 164*     HDL Cholesterol 07/07/2017 33*     LDL Calculated 07/07/2017 95      Patient Fasting > 8hrs? 07/07/2017 Yes      Hemoglobin A1c 07/07/2017 7.7*   Office Visit on 05/11/2017   Component Date Value     Hemoglobin A1c 05/11/2017 7.8*     Sodium 05/11/2017 140      Potassium 05/11/2017 4.5      Chloride 05/11/2017 98      CO2 05/11/2017 28      Anion Gap, Calculation 05/11/2017 14      Glucose 05/11/2017 157*     Calcium 05/11/2017 9.9      BUN 05/11/2017 17      Creatinine 05/11/2017 1.21      GFR MDRD Af Amer 05/11/2017 >60      GFR MDRD Non Af Amer 05/11/2017 59*     Past Imaging  No results found.

## 2021-06-13 NOTE — PROGRESS NOTES
Office Visit - Follow Up   Aydin Knutson Jr.   72 y.o. male    Date of Visit: 10/6/2017    Chief Complaint   Patient presents with     Diabetes        Assessment and Plan   1. DM - Type 2 diabetes mellitus without complication, without long-term current use of insulin  Generally well controlled on 3 oral medications.  He does not want to start any more medication.  Recommended reduction in carbohydrates and calories and a modest weight loss.  Continue aspirin and statin, follow-up A1c in 3 month  - Basic Metabolic Panel    2. Pulmonary fibrosis - Dr. Bird  He is on supplemental oxygen    3. Chronic diastolic heart failure  Furosemide recently increased by cardiology, some days he may need to use a second dose as needed because he does have to be neurovascular    4. CAD, BMSx4 1998  Tinea secondary prevention    5. Screening for colon cancer  - Cologuajuaquin    The following high BMI interventions were performed this visit: encouragement to exercise and lifestyle education regarding diet    Return in about 3 months (around 1/6/2018) for recheck.     History of Present Illness   This 72 y.o. old man comes in for numerous medical problems follow up.  Since her last visit he met with our diabetes educator and had a five-day glucose monitor and has postprandial hyperglycemia.  He has not been able to monitor and manage carbohydrates very much.  He is gained a couple pounds.  He is not sure that his blood sugar is all that well controlled.  He saw Dr. Bird.  He is on oxygen for pulmonary fibrosis.  Furosemide recently increased by his cardiologist and this is been a challenge for him because he has to urinate a lot.  His breathing and edema seem to be improved he has had no chest pain.  Does not want to take anymore diabetes medication.    Review of Systems: A comprehensive review of systems was negative except as noted.     Medications, Allergies and Problem List   Reviewed and updated     Physical Exam   General  "Appearance:   No acute distress    /74 (Patient Site: Left Arm, Patient Position: Sitting, Cuff Size: Adult Regular)  Pulse 71  Ht 5' 3\" (1.6 m)  Wt 212 lb (96.2 kg)  SpO2 90%  BMI 37.55 kg/m2    HEENT exam is unremarkable  Neck supple no thyromegaly or nodule palpable  Lymphatic no cervical lymphadenopathy  Cardiovascular regular rate and rhythm no murmur gallop or rub  Pulmonary lungs demonstrate bibasilar fine crackles  Gastrointestinall abdomen soft nontender nondistended no organomegaly  Neurologic exam is non focal  Psychiatric pleasant, no confusion or agitation        Additional Information   Current Outpatient Prescriptions   Medication Sig Dispense Refill     ALPRAZolam (XANAX) 0.25 MG tablet Take 1 tablet (0.25 mg total) by mouth bedtime as needed. 30 tablet 1     aspirin 81 MG EC tablet Take 81 mg by mouth daily.       atorvastatin (LIPITOR) 80 MG tablet Take 80 mg by mouth bedtime.       bisoprolol (ZEBETA) 5 MG tablet Take 1 tablet (5 mg total) by mouth daily. 90 tablet 0     blood glucose test (GLUCOSE BLOOD) strips Use 1 each As Directed 2 (two) times a day. Accu check Loren and BS checks every morning 100 each 3     blood sugar diagnostic Strp Use 1 strip As Directed 2 times a day at 6:00 am and 4:00 pm. (Patient taking differently: Use 1 strip As Directed daily. ) 100 strip 11     canagliflozin (INVOKANA) 100 mg Tab Take 1 tablet (100 mg total) by mouth daily. Before the 1st meal of the day 30 tablet 5     co-enzyme Q-10 50 mg capsule Take 50 mg by mouth daily.       diltiazem (DILACOR XR) 240 MG 24 hr capsule Take 240 mg by mouth daily.       furosemide (LASIX) 40 MG tablet Take 1 tablet (40 mg total) by mouth daily. (Patient taking differently: Take 40 mg by mouth 2 (two) times a day at 9am and 6pm. ) 90 tablet 0     gabapentin (NEURONTIN) 300 MG capsule Take 1 capsule (300 mg total) by mouth 2 (two) times a day. 180 capsule 11     glipiZIDE (GLUCOTROL XL) 10 MG 24 hr tablet TAKE ONE " TABLET BY MOUTH TWICE DAILY  90 tablet 2     ipratropium (ATROVENT) 0.03 % nasal spray 2 sprays into each nostril 3 (three) times a day. 30 mL 2     irbesartan (AVAPRO) 300 MG tablet Take 300 mg by mouth daily.   3     KLOR-CON M10 10 mEq tablet TAKE ONE TABLET BY MOUTH ONE TIME DAILY  90 tablet 3     metFORMIN (GLUCOPHAGE) 1000 MG tablet TAKE ONE TABLET BY MOUTH TWICE DAILY WITH MEALS 180 tablet 0     omeprazole (PRILOSEC) 20 MG capsule Take 20 mg by mouth daily.       ONETOUCH ULTRA TEST strips USE 1 STRIP AS DIRECTED 2 TIMES A DAY AT 6:00 AM AND 4:00 PM. 100 each 10     No current facility-administered medications for this visit.      Allergies   Allergen Reactions     Amlodipine Swelling     Amoxicillin      Severe yeast infection and ended up in ER for this along with bleeding from area      Lisinopril Cough     Cough, but **may** have been from lung disease, not the drug.  TBrinkMD - 3/9/15     Social History   Substance Use Topics     Smoking status: Former Smoker     Packs/day: 0.50     Years: 20.00     Types: Cigarettes     Quit date: 7/11/1986     Smokeless tobacco: Never Used     Alcohol use Yes      Comment: rare       Review and/or order of clinical lab tests:  Review and/or order of radiology tests:  Review and/or order of medicine tests:  Discussion of test results with performing physician:  Decision to obtain old records and/or obtain history from someone other than the patient:  Review and summarization of old records and/or obtaining history from someone other than the patient and.or discussion of case with another health care provider:  Independent visualization of image, tracing or specimen itself:    Time:      Ramon Burns MD

## 2021-06-14 NOTE — PROGRESS NOTES
Assessment/Plan:     1. Heart failure with preserved ejection fraction, NYHA class III: Aydin Knutson Jr. appears well compensated.  He states his dyspnea on exertion has improved since being discharged.  He has chronic lower extremity edema and wears compression socks for this.  We discussed heart failure, following a low salt diet, monitoring weights, and heart failure treatment.  He saw his primary doctor who checked a BMP, BNP, and magnesium today.  Based on lab results may increase his Lasix.  His oxygen need has increased and he is on 5-6 L per nasal cannula when he is normally on 2 L.  I discontinued his losartan and started him on irbesartan 300 mg daily which he had been on previously.  He will continue Lasix 40 mg 3 times a day.    2. Atrial fibrillation: Rate controlled.  He continues diltiazem 240 mg daily and Eliquis 5 mg twice a day for anticoagulation.  I will have him follow-up in the atrial fibrillation clinic to discuss plan and possible cardioversion.  He believes his balance issues and need for higher oxygen are due to his atrial fibrillation which is newly diagnosed a few weeks ago.    Follow up with Dr. Ortiz in 4-6 weeks, and in the atrial fibrillation next week, and in the heart failure clinic in 10 weeks or sooner if needed    Subjective:     Aydin Knutson Jr. is seen at [facility] heart failure clinic today for post-hospitalization follow-up.  His wife accompanies him today.  He was hospitalized at [facility] from November 22 - November 25, 2017 with shortness of breath.  He was previously hospitalized from November 13 - November 15, 2017 for chest pain.  He was found to be in new paroxysmal atrial fibrillation.  He had a nuclear stress test which was negative for inducible myocardial ischemia or infarction.  He was started on Eliquis. The most recent evaluation of His ejection fraction was 58% from an  echo on 11/13/2017.  His past medical history is also significant for  "hypertension, coronary artery disease, atrial fibrillation, dyslipidemia, diabetes and obstructive sleep apnea.  He also has a history of chronic interstitial lung disease with pulmonary fibrosis.  He is on chronic oxygen.    Since being discharged from the hospital, Wu feels that he is improving.  He states his dyspnea on exertion has improved since discharge.  His main concern is balance issues.  This has been going on since being newly diagnosed with atrial fibrillation a few weeks ago.  He denies any falls.  He states his oxygen has also had to be increased from 2 L per nasal cannula to 5 L per nasal cannula since atrial fibrillation.  He denies any orthopnea or PND.  He denies any chest pain.  Denies lightheadedness, orthopnea, PND and chest pain.      Aydin Knutson Jr. s weight at discharge was 211 pounds.  He is monitoring home weights which are stable around 206 pounds.  He is following a low sodium diet.      Medication reconciliation was done today.    Review of Systems:   General: WNL  Eyes: WNL  Ears/Nose/Throat: WNL  Lungs: Cough, Shortness of Breath  Heart: Shortness of Breath with activity, Irregular Heartbeat, Leg Swelling  Stomach: WNL  Bladder: WNL  Muscle/Joints: WNL  Skin: WNL  Nervous System: Dizziness, Loss of Balance  Mental Health: WNL     Blood: Easy Bruising    Patient Active Problem List   Diagnosis     Hyperlipidemia     Erectile dysfunction     Pulmonary fibrosis - Dr. Bird     SOFI - uses BiPAP     AAA - abdominal aortic aneurysm - followed by Dr. Warner     Prostate Ca, s/p resection     Obesity     Hypertrophic \"Non-obstructive\" cardiomyopathy - Dr. Joy     Allergic rhinitis - chronic posterior rhinorrhea     Cataract - on eye exam, July 2015, [facility]     GERD      CAD, BMSx4 1998     Phimosis - has urology follow-up in September     H/O total hip arthroplasty - right hip - Eryn recall - has seen [facility] - watchful waiting - reassess in four months (TWB, 10/6/16)     " Edema - long term - venous insufficiency     Type 2 diabetes mellitus, controlled     Chronic diastolic heart failure     Depression with anxiety     Anxiety     Atrial fibrillation     Essential hypertension     Chronic interstitial lung disease     Coronary artery disease due to lipid rich plaque       Past Medical History:   Diagnosis Date     AAA - abdominal aortic aneurysm - followed by Dr. Warner 12/27/2014     Arteriosclerosis of coronary artery 7/27/2016    Overview:  Angioplasty 1995     [facility], [facility] ---> Lotrisone 4/19/2016     CAD (coronary artery disease)     stent times 4     Chest pain      DM II (diabetes mellitus, type II), controlled      GERD (gastroesophageal reflux disease)      HTN (hypertension)      Hyperlipidemia 12/11/2014     Influenza A 12/15/2014     Pneumonia      Prostate cancer      Pulmonary fibrosis      Sleep apnea        Past Surgical History:   Procedure Laterality Date     CORONARY STENT PLACEMENT  1998    x4     ILIAC ARTERY ANEURYSM REPAIR Left      ILIAC ARTERY STENT Left      PROSTATECTOMY      radical, [place]     REVISION TOTAL HIP ARTHROPLASTY Bilateral     Left x2, Right x1     TONSILLECTOMY       UMBILICAL HERNIA REPAIR         Family History   Problem Relation Age of Onset     Pulmonary fibrosis Mother      Depression Mother      Pulmonary fibrosis Father      Depression Father      Depression Sister      Diabetes Sister      Pulmonary embolism Sister      Depression Brother      No Medical Problems Daughter      No Medical Problems Son      Depression Brother      Depression Brother      Depression Sister      Depression Sister        Social History     Social History     Marital status:      Spouse name: N/A     Number of children: N/A     Years of education: N/A     Occupational History     Not on file.     Social History Main Topics     Smoking status: Former Smoker     Packs/day: 0.50     Years: 20.00     Types: Cigarettes      Quit date: 7/11/1986     Smokeless tobacco: Never Used     Alcohol use Yes      Comment: Rare     Drug use: No     Sexual activity: Not Currently     Partners: Female     Other Topics Concern     Not on file     Social History Narrative    Lives with his wife, Inez Solano here at [facility].  [facility].         Current Outpatient Prescriptions   Medication Sig Dispense Refill     ALPRAZolam (XANAX) 0.25 MG tablet Take 1 tablet (0.25 mg total) by mouth bedtime as needed. 30 tablet 1     apixaban (ELIQUIS) 5 mg Tab tablet Take 1 tablet (5 mg total) by mouth 2 (two) times a day. 60 tablet 0     aspirin 81 MG EC tablet Take 81 mg by mouth daily.       atorvastatin (LIPITOR) 80 MG tablet Take 80 mg by mouth bedtime.       b complex vitamins tablet Take 1 tablet by mouth daily.       bisoprolol (ZEBETA) 5 MG tablet Take 1 tablet (5 mg total) by mouth daily. 90 tablet 0     blood glucose test (GLUCOSE BLOOD) strips Use 1 each As Directed 2 (two) times a day. Accu check Loren and BS checks every morning 100 each 3     cholecalciferol, vitamin D3, 5,000 unit Tab Take 5,000 Units by mouth daily.       diltiazem (DILACOR XR) 240 MG 24 hr capsule Take 240 mg by mouth daily.       furosemide (LASIX) 40 MG tablet Take 1 tablet (40 mg total) by mouth 3 (three) times a day. 60 tablet 0     gabapentin (NEURONTIN) 300 MG capsule Take 300 mg by mouth every morning. ( in addition to 2 capsules in the evening)       gabapentin (NEURONTIN) 300 MG capsule Take 600 mg by mouth every evening. (In addition to one capsule in the morning)       glipiZIDE (GLUCOTROL XL) 10 MG 24 hr tablet Take 10 mg by mouth 2 (two) times a day. With meals       ipratropium (ATROVENT) 0.03 % nasal spray 2 sprays into each nostril daily as needed for rhinitis.       metFORMIN (GLUCOPHAGE) 1000 MG tablet Take 1,000 mg by mouth 2 (two) times a day with meals.       omeprazole (PRILOSEC) 20 MG capsule Take 20 mg by mouth daily.       ONETOUCH ULTRA TEST  strips USE 1 STRIP AS DIRECTED 2 TIMES A DAY AT 6:00 AM AND 4:00 PM. 100 each 10     potassium chloride (KLOR-CON) 10 MEQ CR tablet Take 10 mEq by mouth daily.       predniSONE (DELTASONE) 20 MG tablet Take 1 tablet (20 mg total) by mouth daily. 14 tablet 0     irbesartan (AVAPRO) 300 MG tablet Take 1 tablet (300 mg total) by mouth at bedtime. 90 tablet 3     No current facility-administered medications for this visit.        Allergies   Allergen Reactions     Amlodipine Swelling     Amoxicillin      Severe yeast infection and ended up in ER for this along with bleeding from area      Lisinopril Cough     Cough, but **may** have been from lung disease, not the drug.  TBrinkMD - 3/9/15       Objective:     Vitals:    11/30/17 1532   BP: 120/76   Pulse: 80   Resp: 20     Wt Readings from Last 3 Encounters:   11/30/17 206 lb (93.4 kg)   11/30/17 206 lb (93.4 kg)   11/28/17 209 lb (94.8 kg)       General Appearance:   Alert, cooperative and in no acute distress.   HEENT:  No scleral icterus; the mucous membranes were pink and moist.   Neck: JVP normal. No HJR   Chest: The spine was straight. The chest was symmetric.   Lungs:   Respirations unlabored; the lungs are decreased to auscultation.   Cardiovascular:    Irregularly irregular. S1 and S2 without murmur, clicks or rubs. Radial and posterior tibial pulses are intact and symmetrical.    Abdomen:  Soft, nontender, nondistended, bowel sounds present   Extremities: No cyanosis. Trace bilateral lower extremity edema. Wearing compression stockings   Skin: No xanthelasma.   Neurologic: Mood and affect are appropriate.         Lab Review   Lab Results   Component Value Date    CREATININE 1.15 11/25/2017    BUN 33 (H) 11/25/2017     11/25/2017    K 4.1 11/25/2017    K 4.1 11/25/2017    CL 99 11/25/2017    CO2 32 (H) 11/25/2017     Lab Results   Component Value Date     (H) 11/23/2017     BNP (pg/mL)   Date Value   11/23/2017 418 (H)   11/22/2017 691 (H)    11/17/2017 455 (H)     Creatinine (mg/dL)   Date Value   11/25/2017 1.15   11/24/2017 1.07   11/23/2017 1.20   11/22/2017 1.26       Cardiographics  Echocardiogram: 11/13/2017  Summary     1.Left ventricle ejection fraction is normal. The calculated left ventricular ejection fraction is 58%.    2.Intracavitary left ventricular gradient of 6 mmHg.    3.TAPSE is abnormal, which is consistent with abnormal right ventricular systolic function.    4.No hemodynamically significant valvular heart abnormalities, regurgitant lesions of the 4 cardiac valves as listed below.    5.When compared to the previous study dated 11/9/2016, there is a suggestion of decreased right ventricular wall motion, and pulmonary hypertension is not seen on the current study.     NM pharmacological stress test 11/15/2017  Conclusion     1.The pharmacologic nuclear stress test is negative for inducible myocardial ischemia or infarction.    2.The left ventricular ejection fraction is 62%.    3.When compared to the images of 11/10/2016, there has been no significant change.    4.The findings of this examination were communicated to the nursing staff at [facility] and Dr. Ortiz.         40 minutes were face to face spent with the patient with greater than 50% spent on education and counseling.      Edel Wooten, [facility]   Heart Failure Clinic

## 2021-06-14 NOTE — PROGRESS NOTES
Paperwork for increase liter flow and larger concentrator sent to [facility].  Called Kul[facility] JOHN with new CPAP.

## 2021-06-14 NOTE — PROGRESS NOTES
[facility] HEART [facility]   Arrhythmia Clinic    Assessment/Plan:  Diagnoses and all orders for this visit:    Persistent atrial fibrillation and new diagnosis.  Discussed natural progression with atrial fib and potential symptoms.  He appears directly or at least indirectly symptomatic with atrial fib.  Discussed that chest pain is unusual symptom with atrial fib and will have to wait for further episodes to see if that will be his symptom.  I discussed antiarrhythmic role when episodes become more frequent.  Due to CAD he is not a candidate for class Ic antiarrhythmics and due to history of pulmonary fibrosis he is not a candidate for amiodarone.  With chronic kidney disease 2 of the 3 remaining agents would have limited dosing.  I think the best agent to start with would be sotalol 80 mg p.o. twice daily or sotalol 120 mg p.o. daily and using this as renal dose.  This would replace bisoprolol and diltiazem.  I spent time discussing cardioversion and prep for this.  To hold the glipizide and metformin the morning of it.  To take all his prescription medications including diuretic the morning of cardioversion.  He is ready for cardioversion starting December 5 or thereafter.  He would like cardioversion on December 7.  He will get a call later today to confirm date and time of cardioversion.  To follow-up with Dr. Ortiz as scheduled on 1/8/18.  To call if any symptoms of heart failure post cardioversion.  -     EP Cardioversion External; Future; Expected date: 12/4/17    Chronic diastolic heart failure and see below for more details.    Essential hypertension and well-controlled.    SOFI - uses BiPAP consistently.    Acute diastolic CHF (congestive heart failure) and no signs or symptoms of decompensated heart failure.  Hard to tell if shortness of breath is related to heart failure or atrial fib with faster heart rates.  I think episodic shortness of breath is likely related to A. fib with RVR at least in part.  His  heart rates are normally in the 60s in sinus rhythm.  His shortness of breath in sinus rhythm may be better if we decrease diltiazem from 240 to 180 mg p.o. daily.  Will reevaluate at time of cardioversion.  -     furosemide (LASIX) 40 MG tablet; Takes 2 tabs in am and 1 tab in afternoon.  Dispense: 60 tablet; Refill: 0    Other orders  -     Verify informed consent for Elective Cardioversion; Standing  -     Vital signs; Standing  -     NPO 8 hours prior to procedure; Standing  -     Notify Anesthesiologist -; Standing  -     Verify 100% compliance with oral anti-coagulant over the past 3 weeks verbally with the patient prior to cardioversion. Notify procedularlist if missed doses.; Standing  -     Emergency equipment at bedside; Standing  -     Oxygen nasal cannula; 3-6; Standing  -     Inpatient consult to Anesthesiology Reason for Consult? cardioversion; Did you contact the consulting MD? No; Consult priority: Today (urgent); Communication for MD: No phone communication necessary for now; Standing  -     Insert and maintain IV; Standing  -     lidocaine (PF) 10 mg/mL (1 %) injection 0.1-0.3 mL (XYLOCAINE-MPF); Inject 0.1-0.3 mL under the skin as needed (for IV insertion).  -     sodium chloride 0.9%; Infuse 25 mL/hr into a venous catheter continuous.  -     Saline lock IV; Standing  -     sodium chloride 0.9 % flush 3 mL (NS); Infuse 3 mL into a venous catheter Line Care.  -     Bedside Glucose (obtain on all diabetic patients if other blood is not required for labs); Standing  -     Potassium; Standing    40 minutes were spent in face-to-face counseling regarding above diagnoses and options for treatment.    _____________________________________________________________________    Subjective:    I had the opportunity to see Aydin Knutson Jr. at the [facility]. Aydin Knutson Jr. is a 72 y.o. male and here for hospital follow-up for newly diagnosed persistent atrial fib and has been hospitalized  twice in November with shortness of breath probably triggered in part by A. fib with RVR leading to heart failure.  Aydin Knutson Jr. has a known history of pulmonary fibrosis and has family history in both mother and father of pulmonary fibrosis.  He tells me he is been doing good until last year when which he had to go on continuous oxygen.  He now adjusts his oxygen from 3-6 L depending upon his activity level.  Since this last hospitalization, he cannot use a condenser as he cannot get enough oxygen off of that.  He gets anxious with shortness of breath.  With his  first hospitalization, he tells me he was acutely short of breath and had chest pain which went down his left arm.  He did not immediately notice that the heart rate was fast as never had atrial fib before.  He has chronic kidney disease and chronic peripheral edema for over 20 years.  He has been on diuretics.  He wears compression stockings all the time.  With last hospitalization they increased his furosemide from 40 mg twice daily to 80 mg in the morning and 40 in the afternoon. He is on potassium supplement of 10 mEq p.o. daily. He tells me his weight at home has been stable from day today.  He is good about doing weights and eats low-sodium diet.  He has SOFI and uses BiPAP.  He also has type 2 diabetes, chronic diastolic heart failure, coronary artery disease, hyperlipidemia and hypertension.  He is complaining that his shortness of breath has been worse since he has been in atrial fib and also more fatigued.  He is now napping 2-3 hours during the daytime and had not done this previously.  He complains of shortness of breath after dressing for instance and previously did not make him short of breath.  He has been on diltiazem 240 mg plus bisoprolol 5 mg p.o. daily long-term.  These medications were not increased for rate control with atrial fib.  With first hospitalization, he was started on Eliquis on November 13 and tells me he has not missed  "a single dose since then.   Wu tells me he is been on continuous oxygen only since last year.  He had a syncopal episode in an airplane and syncope was related to hypoxemia at high altitude with plane flight.   This visit will serve as history and physical for cardioversion.  See problem list for more details.  Medical, past medical, surgical and social history reviewed and updated.  Meds and allergies reviewed.      ______________________________________________________________________    Problem List:  Patient Active Problem List   Diagnosis     Hyperlipidemia     Erectile dysfunction     Pulmonary fibrosis - Dr. Bird     SOFI - uses BiPAP     AAA - abdominal aortic aneurysm - followed by Dr. Warner     Prostate Ca, s/p resection     Obesity     Hypertrophic \"Non-obstructive\" cardiomyopathy - Dr. Joy     Allergic rhinitis - chronic posterior rhinorrhea     Cataract - on eye exam, July 2015, [facility]     GERD      CAD, BMSx4 1998     Phimosis - has urology follow-up in September     H/O total hip arthroplasty - right hip - [place] recall - has seen [facility] - watchful waiting - reassess in four months ([facility], 10/6/16)     Edema - long term - venous insufficiency     Type 2 diabetes mellitus, controlled     Chronic diastolic heart failure     Depression with anxiety     Anxiety     Persistent atrial fibrillation     Essential hypertension     Chronic interstitial lung disease     Coronary artery disease due to lipid rich plaque     Medical History:  Past Medical History:   Diagnosis Date     AAA - abdominal aortic aneurysm - followed by Dr. Warner 12/27/2014     Arteriosclerosis of coronary artery 7/27/2016    Overview:  Angioplasty 1995     Balanitis - [facility] - April, 2016 ---> Lotrisone 4/19/2016     CAD (coronary artery disease)     stent times 4     Chest pain      DM II (diabetes mellitus, type II), controlled      GERD (gastroesophageal reflux disease)      HTN (hypertension)      " Hyperlipidemia 12/11/2014     Influenza A 12/15/2014     Pneumonia      Prostate cancer      Pulmonary fibrosis      Sleep apnea      Surgical History:  Past Surgical History:   Procedure Laterality Date     CORONARY STENT PLACEMENT  1998    x4     ILIAC ARTERY ANEURYSM REPAIR Left      ILIAC ARTERY STENT Left      PROSTATECTOMY      radical, [place]     REVISION TOTAL HIP ARTHROPLASTY Bilateral     Left x2, Right x1     TONSILLECTOMY       UMBILICAL HERNIA REPAIR       Social History:  Social History   Substance Use Topics     Smoking status: Former Smoker     Packs/day: 0.50     Years: 20.00     Types: Cigarettes     Quit date: 7/11/1986     Smokeless tobacco: Never Used     Alcohol use Yes      Comment: Rare        Review of Systems: Review of Systems:   General: WNL  Eyes: WNL  Ears/Nose/Throat: WNL  Lungs: WNL  Heart: WNL  Stomach: WNL  Bladder: WNL  Muscle/Joints: WNL  Skin: WNL  Nervous System: WNL  Mental Health: WNL     Blood: WNL      Family History:  Family History   Problem Relation Age of Onset     Pulmonary fibrosis Mother      Depression Mother      Pulmonary fibrosis Father      Depression Father      Depression Sister      Diabetes Sister      Pulmonary embolism Sister      Depression Brother      No Medical Problems Daughter      No Medical Problems Son      Depression Brother      Depression Brother      Depression Sister      Depression Sister          Allergies:  Allergies   Allergen Reactions     Amlodipine Swelling     Amoxicillin      Severe yeast infection and ended up in ER for this along with bleeding from area      Lisinopril Cough     Cough, but **may** have been from lung disease, not the drug.  TBrinkMD - 3/9/15     Medications:  Current Outpatient Prescriptions   Medication Sig Dispense Refill     ALPRAZolam (XANAX) 0.25 MG tablet Take 1 tablet (0.25 mg total) by mouth bedtime as needed. 30 tablet 1     apixaban (ELIQUIS) 5 mg Tab tablet Take 1 tablet (5 mg total) by mouth 2 (two)  "times a day. 60 tablet 0     aspirin 81 MG EC tablet Take 81 mg by mouth daily.       atorvastatin (LIPITOR) 80 MG tablet Take 80 mg by mouth bedtime.       b complex vitamins tablet Take 1 tablet by mouth daily.       bisoprolol (ZEBETA) 5 MG tablet Take 1 tablet (5 mg total) by mouth daily. 90 tablet 0     blood glucose test (GLUCOSE BLOOD) strips Use 1 each As Directed 2 (two) times a day. Accu check Loren and BS checks every morning 100 each 3     cholecalciferol, vitamin D3, 5,000 unit Tab Take 5,000 Units by mouth daily.       diltiazem (DILACOR XR) 240 MG 24 hr capsule Take 240 mg by mouth daily.       furosemide (LASIX) 40 MG tablet Takes 2 tabs in am and 1 tab in afternoon. 60 tablet 0     gabapentin (NEURONTIN) 300 MG capsule Take 300 mg by mouth every morning. ( in addition to 2 capsules in the evening)       gabapentin (NEURONTIN) 300 MG capsule Take 600 mg by mouth every evening. (In addition to one capsule in the morning)       glipiZIDE (GLUCOTROL XL) 10 MG 24 hr tablet Take 10 mg by mouth 2 (two) times a day. With meals       ipratropium (ATROVENT) 0.03 % nasal spray 2 sprays into each nostril daily as needed for rhinitis.       irbesartan (AVAPRO) 300 MG tablet Take 1 tablet (300 mg total) by mouth at bedtime. 90 tablet 3     metFORMIN (GLUCOPHAGE) 1000 MG tablet Take 1,000 mg by mouth 2 (two) times a day with meals.       omeprazole (PRILOSEC) 20 MG capsule Take 20 mg by mouth daily.       ONETOUCH ULTRA TEST strips USE 1 STRIP AS DIRECTED 2 TIMES A DAY AT 6:00 AM AND 4:00 PM. 100 each 10     potassium chloride (KLOR-CON) 10 MEQ CR tablet Take 10 mEq by mouth daily.       predniSONE (DELTASONE) 20 MG tablet Take 1 tablet (20 mg total) by mouth daily. 14 tablet 0     No current facility-administered medications for this visit.        Objective:   Vital signs:  /84 (Patient Site: Right Arm, Patient Position: Sitting, Cuff Size: Adult Large)  Pulse 96 Comment: 90's-100's  Resp 20  Ht 5' 3\" " (1.6 m)  Wt 205 lb (93 kg)  BMI 36.31 kg/m2      Physical Exam:    GENERAL APPEARANCE: Alert, cooperative and in no acute distress.  HEENT: No scleral icterus. No Xanthelasma. Oral mucuos membranes pink and moist.  NECK: JVP Nl.   CHEST: Crackles in bilateral bases but tells me that this is present all the time for him with pulmonary fibrosis.  CARDIOVASCULAR: S1, S2 without murmur ,clicks or rubs. Regular, regular.  Radial and posterior tibial pulses are intact and symetric.   EXTREMITIES: No cyanosis, clubbing .  1+ bilateral lower extremity edema and wearing compression stockings bilaterally.    Results personally reviewed:  Results for orders placed during the hospital encounter of 11/13/17   Echo Complete [ECH10] 11/13/2017    Narrative   1.Left ventricle ejection fraction is normal. The calculated left   ventricular ejection fraction is 58%.    2.Intracavitary left ventricular gradient of 6 mmHg.    3.TAPSE is abnormal, which is consistent with abnormal right ventricular   systolic function.    4.No hemodynamically significant valvular heart abnormalities,   regurgitant lesions of the 4 cardiac valves as listed below.    5.When compared to the previous study dated 11/9/2016, there is a   suggestion of decreased right ventricular wall motion, and pulmonary   hypertension is not seen on the current study.           Results for orders placed during the hospital encounter of 11/13/17   NM Pharmacologic Stress Test    Narrative   1.The pharmacologic nuclear stress test is negative for inducible   myocardial ischemia or infarction.    2.The left ventricular ejection fraction is 62%.    3.When compared to the images of 11/10/2016, there has been no   significant change.    4.The findings of this examination were communicated to the nursing   staff at [facility] and Dr. Ortiz.             Results for orders placed or performed during the hospital encounter of 11/22/17   Electrocardiogram, 12-lead   Result  Value Ref Range    SYSTOLIC BLOOD PRESSURE  mmHg    DIASTOLIC BLOOD PRESSURE  mmHg    VENTRICULAR RATE 94 BPM    ATRIAL RATE 87 BPM    P-R INTERVAL  ms    QRS DURATION 106 ms    Q-T INTERVAL 360 ms    QTC CALCULATION (BEZET) 450 ms    P Axis  degrees    R AXIS -18 degrees    T AXIS 2 degrees    MUSE DIAGNOSIS       Atrial fibrillation Premature ventricular complexes  Abnormal ECG  When compared with ECG of 22-NOV-2017 10:44,  No significant change was found  Confirmed by TONAY RYAN MD LOC: ([id number]) on 11/23/2017 1:43:53 PM         TSH:   Lab Results   Component Value Date    TSH 0.81 11/17/2017     BNP:   Lab Results   Component Value Date     (H) 11/30/2017     BMP:  Lab Results   Component Value Date    CREATININE 1.37 (H) 11/30/2017    BUN 22 11/30/2017     (L) 11/30/2017    K 4.7 11/30/2017    CL 92 (L) 11/30/2017    CO2 30 11/30/2017       This note has been dictated using voice recognition software. Any grammatical or context distortions are unintentional and inherent to the software.    RICHMOND PERDOMO RN, [facility]  [phone]

## 2021-06-14 NOTE — PROGRESS NOTES
Assessment/Plan:        Diagnoses and all orders for this visit:    Chronic interstitial lung disease  -     Cancel: Oxygen  -     Oxygen    Chronic respiratory failure with hypoxia  -     Cancel: Oxygen  -     Oxygen    Acute on chronic diastolic heart failure    Chronic atrial fibrillation      Complicated presentation of what appears to be more difficult control of chronic heart failure in the setting of new onset atrial fibrillation with poor pulmonary reserve due to interstitial lung disease, obstructive sleep apnea with resultant mild to moderate estimated pulmonary hypertension.    Although his chronic lung disease leaves him with limited reserves, given his clinical course over the last few years and even the last few weeks I think it is unlikely this represents a worsening of his chronic lung disease.  I am most concerned that his atrial fibrillation his made his fluid management more difficult.  This is evidenced by improvements in his status (partially) with diuresis.  I am interested to hear from Dr. Massey whether he thinks that rhythm control be helpful and whether it is an option for Mr. Knutson.    In an effort to keep him out of the hospital we did try systemic prednisone.  This was not helpful.  I would not advocate doing this long-term.  His improvement in chronic cough is duly noted and he may end up benefiting from inhaled steroid.    His congestion is most likely due to increased oxygen flow rate.  We will put a bubbler in the circuit of his concentrator.    Continue increased dose of Lasix.    We will send a prescription of new BiPAP machine.        Subjective:    Patient ID: Aydin Knutson Jr. is a 72 y.o. male.    HPI Comments: 72 -year-old male with history of heart failure, ILD here after hospital admission.  He was doing well up until the last few weeks.  His ILD is been clinically stable.  He reports that after going into A. fib was when his fluid management issue started.  He has had no  sputum change, no cough.  He does have increased congestion in the setting of significantly increased flow rate.  Symptoms localized to the chest.  Pertinent positives include increased leg edema compared to baseline.  Pertinent negatives include no hemoptysis or fever.    During his hospital stay he was diuresed and has had some improvement but is still quite hypoxic.    Problems with bipap machine.  Even when he connected to oxygen his sats are not staying adequate level.      Review of Systems positive for congestion nasal discharge sneezing postnasal drip apnea cough shortness of breath dizziness lightheadedness chest pain eye itching.  The remainder of 14 system review systems negative  Current Outpatient Prescriptions on File Prior to Visit   Medication Sig Dispense Refill     ALPRAZolam (XANAX) 0.25 MG tablet Take 1 tablet (0.25 mg total) by mouth bedtime as needed. 30 tablet 1     apixaban (ELIQUIS) 5 mg Tab tablet Take 1 tablet (5 mg total) by mouth 2 (two) times a day. 60 tablet 0     aspirin 81 MG EC tablet Take 81 mg by mouth daily.       atorvastatin (LIPITOR) 80 MG tablet Take 80 mg by mouth bedtime.       b complex vitamins tablet Take 1 tablet by mouth daily.       bisoprolol (ZEBETA) 5 MG tablet Take 1 tablet (5 mg total) by mouth daily. 90 tablet 0     blood glucose test (GLUCOSE BLOOD) strips Use 1 each As Directed 2 (two) times a day. Accu check Loren and BS checks every morning 100 each 3     cholecalciferol, vitamin D3, 5,000 unit Tab Take 5,000 Units by mouth daily.       diltiazem (DILACOR XR) 240 MG 24 hr capsule Take 240 mg by mouth daily.       furosemide (LASIX) 40 MG tablet Take 1 tablet (40 mg total) by mouth 2 (two) times a day at 9am and 6pm. 60 tablet 0     gabapentin (NEURONTIN) 300 MG capsule Take 300 mg by mouth every morning. ( in addition to 2 capsules in the evening)       gabapentin (NEURONTIN) 300 MG capsule Take 600 mg by mouth every evening. (In addition to one capsule in  the morning)       glipiZIDE (GLUCOTROL XL) 10 MG 24 hr tablet Take 10 mg by mouth 2 (two) times a day. With meals       ipratropium (ATROVENT) 0.03 % nasal spray 2 sprays into each nostril daily as needed for rhinitis.       losartan (COZAAR) 100 MG tablet Take 1 tablet (100 mg total) by mouth daily. 30 tablet 0     metFORMIN (GLUCOPHAGE) 1000 MG tablet Take 1,000 mg by mouth 2 (two) times a day with meals.       omeprazole (PRILOSEC) 20 MG capsule Take 20 mg by mouth daily.       ONETOUCH ULTRA TEST strips USE 1 STRIP AS DIRECTED 2 TIMES A DAY AT 6:00 AM AND 4:00 PM. 100 each 10     potassium chloride (KLOR-CON) 10 MEQ CR tablet Take 10 mEq by mouth daily.       predniSONE (DELTASONE) 20 MG tablet Take 1 tablet (20 mg total) by mouth daily. 14 tablet 0     No current facility-administered medications on file prior to visit.      /80  Pulse 75  Resp 17  Wt 209 lb (94.8 kg)  SpO2 94% Comment: 5LPM after deep breathing  BMI 37.02 kg/m2          Objective:    Physical Exam   Constitutional: He is oriented to person, place, and time. He appears well-developed and well-nourished. No distress.   HENT:   Head: Normocephalic.   Nose: Nose normal.   Eyes: Pupils are equal, round, and reactive to light. Right eye exhibits no discharge. Left eye exhibits no discharge. No scleral icterus.   Cardiovascular: Normal rate and regular rhythm.  Exam reveals no gallop and no friction rub.    No murmur heard.  Pulmonary/Chest: Effort normal. No respiratory distress. He has no wheezes. He has rales.   His bilateral posterior crackles right greater than left sounds similar to previous.   Musculoskeletal: He exhibits edema. He exhibits no tenderness.   Neurological: He is alert and oriented to person, place, and time. No cranial nerve deficit.   Skin: Skin is warm and dry. No rash noted. He is not diaphoretic. No erythema. No pallor.   Psychiatric: He has a normal mood and affect. His behavior is normal. Judgment and thought  "content normal.               Medical History  Active Ambulatory (Non-Hospital) Problems    Diagnosis     Coronary artery disease due to lipid rich plaque     Permanent atrial fibrillation     Acute pulmonary edema     Dysuria     Pulmonary edema     Acute on chronic respiratory failure with hypoxia     Acute diastolic congestive heart failure     Atrial fibrillation     Essential hypertension     Chronic interstitial lung disease     Sleep apnea, unspecified type     Anxiety     Arrhythmia     Depression with anxiety     Chronic diastolic heart failure     Type 2 diabetes mellitus, controlled     Acute diastolic CHF (congestive heart failure)     Edema - long term - venous insufficiency     H/O total hip arthroplasty - right hip - Eryn recall - has seen [facility] - watchful waiting - reassess in four months (TWB, 10/6/16)     CAD, BMSx4 1998     Phimosis - has urology follow-up in September     GERD      Cataract - on eye exam, July 2015, [facility]     Cough - AM and PM.     Allergic rhinitis - chronic posterior rhinorrhea     AAA - abdominal aortic aneurysm - followed by Dr. Warner     Prostate Ca, s/p resection     Obesity     Hypertrophic \"Non-obstructive\" cardiomyopathy - Dr. Joy     Pulmonary fibrosis - Dr. Bird     SOFI - uses BiPAP     Hyperlipidemia     Erectile dysfunction     Past Medical History:   Diagnosis Date     AAA - abdominal aortic aneurysm - followed by Dr. Warner 12/27/2014     Arteriosclerosis of coronary artery 7/27/2016     [facility] - April, 2016 ---> Lotrisone 4/19/2016     CAD (coronary artery disease)      Chest pain      DM II (diabetes mellitus, type II), controlled      GERD (gastroesophageal reflux disease)      HTN (hypertension)      Hyperlipidemia 12/11/2014     Influenza A 12/15/2014     Pneumonia      Prostate cancer      Pulmonary fibrosis      Sleep apnea          Social history reviewed -he is here with his wife.  He visited his kids after " discharge and did have some trouble with the   Allergies  Allergies   Allergen Reactions     Amlodipine Swelling     Amoxicillin      Severe yeast infection and ended up in ER for this along with bleeding from area      Lisinopril Cough     Cough, but **may** have been from lung disease, not the drug.  TBrinkMD - 3/9/15    FAmily history reviewed and no changes                        Data Review - imaging, labs, and ekgs listed below were reviewed by me.  CXR and CT images  interpreted personally.     Past Labs  Admission on 11/22/2017, Discharged on 11/25/2017   Component Date Value     VENTRICULAR RATE 11/22/2017 84      ATRIAL RATE 11/22/2017 72      QRS DURATION 11/22/2017 96      Q-T INTERVAL 11/22/2017 374      QTC CALCULATION (BEZET) 11/22/2017 441      R AXIS 11/22/2017 -27      T AXIS 11/22/2017 -23      MUSE DIAGNOSIS 11/22/2017                      Value:Atrial fibrillation with premature ventricular or aberrantly conducted complexes  Minimal voltage criteria for LVH, may be normal variant  Abnormal ECG  When compared with ECG of 13-NOV-2017 09:53,  Premature ventricular complexes is now Present  Confirmed by VERO KELLY, [facility] LOC:JN ([id number]) on 11/22/2017 4:10:42 PM       Sodium 11/22/2017 137      Potassium 11/22/2017 4.4      Chloride 11/22/2017 97*     CO2 11/22/2017 26      Glucose 11/22/2017 355*     BUN 11/22/2017 23      Creatinine 11/22/2017 1.26      GFR MDRD Af Amer 11/22/2017 >60      GFR MDRD Non Af Amer 11/22/2017 57*     Calcium 11/22/2017 10.0      Troponin I 11/22/2017 0.02      WBC 11/22/2017 11.9*     RBC 11/22/2017 4.21*     Hemoglobin 11/22/2017 12.1*     Hematocrit 11/22/2017 36.8*     MCV 11/22/2017 87      MCH 11/22/2017 28.7      MCHC 11/22/2017 32.9      RDW 11/22/2017 15.3*     Platelets 11/22/2017 282      MPV 11/22/2017 10.5      INR 11/22/2017 1.29*     PTT 11/22/2017 35      BNP 11/22/2017 691*     Glucose, POC 11/22/2017 238      Procalcitonin 11/22/2017 0.10      Troponin I  11/22/2017 0.04      Glucose, POC 11/22/2017 194      VENTRICULAR RATE 11/23/2017 94      ATRIAL RATE 11/23/2017 87      QRS DURATION 11/23/2017 106      Q-T INTERVAL 11/23/2017 360      QTC CALCULATION (BEZET) 11/23/2017 450      R AXIS 11/23/2017 -18      T AXIS 11/23/2017 2      MUSE DIAGNOSIS 11/23/2017                      Value:Atrial fibrillation Premature ventricular complexes  Abnormal ECG  When compared with ECG of 22-NOV-2017 10:44,  No significant change was found  Confirmed by TONYA RYAN MD LOC: ([id number]) on 11/23/2017 1:43:53 PM       BNP 11/23/2017 418*     WBC 11/23/2017 10.6      RBC 11/23/2017 3.79*     Hemoglobin 11/23/2017 10.8*     Hematocrit 11/23/2017 33.5*     MCV 11/23/2017 88      MCH 11/23/2017 28.5      MCHC 11/23/2017 32.2      RDW 11/23/2017 15.5*     Platelets 11/23/2017 241      MPV 11/23/2017 10.5      Troponin I 11/23/2017 0.04      Sodium 11/23/2017 140      Potassium 11/23/2017 3.7      Chloride 11/23/2017 97*     CO2 11/23/2017 34*     Anion Gap, Calculation 11/23/2017 9      Glucose 11/23/2017 132*     BUN 11/23/2017 26      Creatinine 11/23/2017 1.20      GFR MDRD Af Amer 11/23/2017 >60      GFR MDRD Non Af Amer 11/23/2017 60*     Bilirubin, Total 11/23/2017 0.5      Calcium 11/23/2017 9.6      Protein, Total 11/23/2017 6.6      Albumin 11/23/2017 2.8*     Alkaline Phosphatase 11/23/2017 59      AST 11/23/2017 18      ALT 11/23/2017 24      Glucose, POC 11/22/2017 254      Glucose, POC 11/23/2017 131      Color, UA 11/23/2017 Yellow      Clarity, UA 11/23/2017 Clear      Glucose, UA 11/23/2017 Negative      Bilirubin, UA 11/23/2017 Negative      Ketones, UA 11/23/2017 Negative      Specific Gravity, UA 11/23/2017 1.010      Blood, UA 11/23/2017 Negative      pH, UA 11/23/2017 6.5      Protein, UA 11/23/2017 Trace*     Urobilinogen, UA 11/23/2017 <2.0 E.U./dL      Nitrite, UA 11/23/2017 Negative      Leukocytes, UA 11/23/2017 Trace*     Bacteria, UA 11/23/2017 Few*     RBC,  UA 11/23/2017 0-2      WBC, UA 11/23/2017 0-5      Squam Epithel, UA 11/23/2017 10-25*     Mucus, UA 11/23/2017 Few*     Culture 11/23/2017 Mixture of urogenital organisms with      Culture 11/23/2017 10,000-50,000 col/ml Proteus mirabilis*     Glucose, POC 11/23/2017 267      Glucose, POC 11/23/2017 158      Glucose, POC 11/23/2017 172      Sodium 11/24/2017 141      Potassium 11/24/2017 3.8      Chloride 11/24/2017 99      CO2 11/24/2017 33*     Anion Gap, Calculation 11/24/2017 9      Glucose 11/24/2017 125      Calcium 11/24/2017 9.5      BUN 11/24/2017 28      Creatinine 11/24/2017 1.07      GFR MDRD Af Amer 11/24/2017 >60      GFR MDRD Non Af Amer 11/24/2017 >60      Glucose, POC 11/24/2017 120      Glucose, POC 11/24/2017 223      Glucose, POC 11/24/2017 201      Glucose, POC 11/24/2017 203      Potassium 11/25/2017 4.1      Sodium 11/25/2017 141      Potassium 11/25/2017 4.1      Chloride 11/25/2017 99      CO2 11/25/2017 32*     Anion Gap, Calculation 11/25/2017 10      Glucose 11/25/2017 106      Calcium 11/25/2017 9.3      BUN 11/25/2017 33*     Creatinine 11/25/2017 1.15      GFR MDRD Af Amer 11/25/2017 >60      GFR MDRD Non Af Amer 11/25/2017 >60      Glucose, POC 11/25/2017 94    Office Visit on 11/17/2017   Component Date Value     Sodium 11/17/2017 138      Potassium 11/17/2017 4.1      Chloride 11/17/2017 99      CO2 11/17/2017 28      Anion Gap, Calculation 11/17/2017 11      Glucose 11/17/2017 205*     Calcium 11/17/2017 9.9      BUN 11/17/2017 20      Creatinine 11/17/2017 1.13      GFR MDRD Af Amer 11/17/2017 >60      GFR MDRD Non Af Amer 11/17/2017 >60      BNP 11/17/2017 455*     TSH 11/17/2017 0.81      Hemoglobin A1c 11/17/2017 7.6*     Vitamin B-12 11/17/2017 228      ALICIA Screen [place] 11/17/2017 1.5      MMA Serum/Plasma, Vitami* 11/17/2017 0.21    Admission on 11/13/2017, Discharged on 11/15/2017   Component Date Value     Sodium 11/13/2017 140      Potassium 11/13/2017 3.9      Chloride  11/13/2017 99      CO2 11/13/2017 31      Anion Gap, Calculation 11/13/2017 10      Glucose 11/13/2017 266*     Calcium 11/13/2017 9.7      BUN 11/13/2017 19      Creatinine 11/13/2017 1.26      GFR MDRD Af Amer 11/13/2017 >60      GFR MDRD Non Af Amer 11/13/2017 57*     Troponin I 11/13/2017 0.04      WBC 11/13/2017 9.2      RBC 11/13/2017 4.37*     Hemoglobin 11/13/2017 12.5*     Hematocrit 11/13/2017 38.6*     MCV 11/13/2017 88      MCH 11/13/2017 28.6      MCHC 11/13/2017 32.4      RDW 11/13/2017 15.2*     Platelets 11/13/2017 225      MPV 11/13/2017 10.2      Neutrophils % 11/13/2017 79*     Lymphocytes % 11/13/2017 10*     Monocytes % 11/13/2017 9      Eosinophils % 11/13/2017 2      Basophils % 11/13/2017 1      Neutrophils Absolute 11/13/2017 7.2      Lymphocytes Absolute 11/13/2017 0.9      Monocytes Absolute 11/13/2017 0.8      Eosinophils Absolute 11/13/2017 0.2      Basophils Absolute 11/13/2017 0.1      VENTRICULAR RATE 11/13/2017 73      ATRIAL RATE 11/13/2017 84      QRS DURATION 11/13/2017 106      Q-T INTERVAL 11/13/2017 394      QTC CALCULATION (BEZET) 11/13/2017 434      R AXIS 11/13/2017 -23      T AXIS 11/13/2017 -32      MUSE DIAGNOSIS 11/13/2017                      Value:Atrial fibrillation  Nonspecific T wave abnormality  Abnormal ECG  When compared with ECG of 08-NOV-2016 15:43,  Atrial fibrillation has replaced Sinus rhythm    Confirmed by CANDIDA HUMPHRIES MD LOC:SJ ([id number]) on 11/14/2017 8:36:59 AM       BNP 11/13/2017 639*     BSA 11/13/2017 2.09      Hieght 11/13/2017 63      Weight 11/13/2017 3488      BP 11/13/2017 144/88      HR 11/13/2017 63      LV volume systolic 11/13/2017 47.5      LV volume diastolic 11/13/2017 113      LVOT peak VTI 11/13/2017 22.9      LVOT mean sapphire 11/13/2017 83.5      MV E' med sapphire 11/13/2017 6.28      LVOT diam 11/13/2017 2.36      LVOT mean gradient 11/13/2017 2.83      MV E' lat sapphire 11/13/2017 8.48      LV PWd 11/13/2017 1.31      LVOT peak gradient  11/13/2017 3.51      IVSd 11/13/2017 1.37      LVIDs 11/13/2017 2.96      LVIDd 11/13/2017 4.00      LA size 11/13/2017 5.32      AR p 1/2 time 11/13/2017 588      AV mean gradient 11/13/2017 4.66      AV VTI 11/13/2017 25.8      AV mean sapphire 11/13/2017 106      MV peak E sapphire 11/13/2017 105      MV decel time 11/13/2017 0.121      VT peak gradient 11/13/2017 17.4      VT peak sapphire 11/13/2017 209      AO root 11/13/2017 3.47      IVS/PW ratio 11/13/2017 1.0      TR peak gradent 11/13/2017 36.0      LV FS 11/13/2017 26.0      Echo LVEF calculated 11/13/2017 58      LA volume 11/13/2017 131.0      LV mass 11/13/2017 195.4      TR peak sapphire 11/13/2017 300.0      AV area 11/13/2017 3.9      LVOT area 11/13/2017 4.37      LVOT SV 11/13/2017 100.1      LV systolic volume index 11/13/2017 22.7      LV diastolic volume index 11/13/2017 54.1      LA volume index 11/13/2017 62.7      LV mass index 11/13/2017 93.5      LV SVi 11/13/2017 47.9      TAPSE 11/13/2017 1.6      MV med E/e' ratio 11/13/2017 16.7      MV lat E/e' ratio 11/13/2017 12.4      LV CO 11/13/2017 6.3      LV Ci 11/13/2017 3.0      LA area 2 11/13/2017 34      LA area 1 11/13/2017 29      Height 11/13/2017 63.0      Weight 11/13/2017 218      MV Avg E/e' Ratio 11/13/2017 14.2      LA length 11/13/2017 6.4      AV DIM IND VTI 11/13/2017 0.9      Glucose, POC 11/13/2017 134      Troponin I 11/13/2017 0.02      Glucose, POC 11/13/2017 151      Glucose, POC 11/13/2017 176      Albumin 11/14/2017 3.0*     Calcium 11/14/2017 9.2      Phosphorus 11/14/2017 2.7      Glucose 11/14/2017 84      BUN 11/14/2017 18      Creatinine 11/14/2017 1.08      Sodium 11/14/2017 142      Potassium 11/14/2017 3.9      Chloride 11/14/2017 100      CO2 11/14/2017 33*     Anion Gap, Calculation 11/14/2017 9      GFR MDRD Af Amer 11/14/2017 >60      GFR MDRD Non Af Amer 11/14/2017 >60      Glucose, POC 11/14/2017 149      Baseline HR 11/15/2017 81      Baseline BP 11/15/2017 148/95       Last Stress BP 11/15/2017 179/92      Max HR 11/15/2017 86      Calculated Percent HR 11/15/2017 57      Left Ventricular EF 11/15/2017 62      Glucose, POC 11/14/2017 151      Glucose, POC 11/14/2017 170      Glucose, POC 11/14/2017 205      Sodium 11/15/2017 141      Potassium 11/15/2017 4.0      Chloride 11/15/2017 98      CO2 11/15/2017 36*     Anion Gap, Calculation 11/15/2017 7      Glucose 11/15/2017 72      Calcium 11/15/2017 9.6      BUN 11/15/2017 22      Creatinine 11/15/2017 1.07      GFR MDRD Af Amer 11/15/2017 >60      GFR MDRD Non Af Amer 11/15/2017 >60      WBC 11/15/2017 7.6      RBC 11/15/2017 4.10*     Hemoglobin 11/15/2017 11.8*     Hematocrit 11/15/2017 36.2*     MCV 11/15/2017 88      MCH 11/15/2017 28.8      MCHC 11/15/2017 32.6      RDW 11/15/2017 15.1*     Platelets 11/15/2017 198      MPV 11/15/2017 10.3      Glucose, POC 11/15/2017 166      Glucose, POC 11/15/2017 247    Office Visit on 10/06/2017   Component Date Value     Sodium 10/06/2017 142      Potassium 10/06/2017 3.6      Chloride 10/06/2017 104      CO2 10/06/2017 24      Anion Gap, Calculation 10/06/2017 14      Glucose 10/06/2017 198*     Calcium 10/06/2017 9.9      BUN 10/06/2017 21      Creatinine 10/06/2017 1.38*     GFR MDRD Af Amer 10/06/2017 >60      GFR MDRD Non Af Amer 10/06/2017 51*   Physical on 07/07/2017   Component Date Value     WBC 07/07/2017 8.7      RBC 07/07/2017 4.64      Hemoglobin 07/07/2017 14.1      Hematocrit 07/07/2017 42.5      MCV 07/07/2017 92      MCH 07/07/2017 30.4      MCHC 07/07/2017 33.2      RDW 07/07/2017 14.6*     Platelets 07/07/2017 221      MPV 07/07/2017 11.5      Sodium 07/07/2017 141      Potassium 07/07/2017 4.1      Chloride 07/07/2017 101      CO2 07/07/2017 27      Anion Gap, Calculation 07/07/2017 13      Glucose 07/07/2017 130*     BUN 07/07/2017 19      Creatinine 07/07/2017 1.27      GFR MDRD Af Amer 07/07/2017 >60      GFR MDRD Non Af Amer 07/07/2017 56*     Bilirubin, Total  07/07/2017 0.6      Calcium 07/07/2017 9.8      Protein, Total 07/07/2017 7.8      Albumin 07/07/2017 4.0      Alkaline Phosphatase 07/07/2017 85      AST 07/07/2017 20      ALT 07/07/2017 19      Cholesterol 07/07/2017 161      Triglycerides 07/07/2017 164*     HDL Cholesterol 07/07/2017 33*     LDL Calculated 07/07/2017 95      Patient Fasting > 8hrs? 07/07/2017 Yes      Hemoglobin A1c 07/07/2017 7.7*     Past Imaging  Xr Chest Pa And Lateral    Result Date: 11/22/2017  XR CHEST PA AND LATERAL 11/22/2017 11:48 AM INDICATION: Chest pain. COMPARISON: 11/13/2017. FINDINGS: Lung volumes are diminished. There are coarse bilateral infiltrates, not significantly changed from prior study. Heart and mediastinal size are stable and within normal limits. No obvious pleural effusion or pneumothorax.    Xr Chest Pa And Lateral    Result Date: 11/13/2017  XR CHEST PA AND LATERAL 11/13/2017 12:56 PM INDICATION: chest pain COMPARISON: High-resolution chest CT 1/17/2017, chest x-ray 11/8/2016 and multiple prior studies. FINDINGS: Low lung volumes. Slight increase in the bilateral interstitial opacities compared to last year. Heart border is shaggy due to the underlying pulmonary disease. No effusions.    Nm Pharmacologic Stress Test    Result Date: 11/15/2017    1.The pharmacologic nuclear stress test is negative for inducible myocardial ischemia or infarction.   2.The left ventricular ejection fraction is 62%.   3.When compared to the images of 11/10/2016, there has been no significant change.   4.The findings of this examination were communicated to the nursing staff at [facility] and Dr. Ortiz.

## 2021-06-14 NOTE — ANESTHESIA CARE TRANSFER NOTE
Last vitals:   Vitals:    12/07/17 1327   BP: 144/79   Pulse: 82   Resp: (!) 29   Temp:    SpO2: 100%     Patient's level of consciousness is drowsy  Spontaneous respirations: yes  Maintains airway independently: yes  Dentition unchanged: yes  Oropharynx: oropharynx clear of all foreign objects    QCDR Measures:  ASA# 20 - Surgical Safety Checklist: WHO surgical safety checklist completed prior to induction  PQRS# 430 - Adult PONV Prevention: NA - Not adult patient, not GA or 3 or more risk factors NOT present  ASA# 8 - Peds PONV Prevention: NA - Not pediatric patient, not GA or 2 or more risk factors NOT present  PQRS# 424 - Janice-op Temp Management: 4559F - At least one body temp DOCUMENTED => 35.5C or 95.9F within required timeframe  PQRS# 426 - PACU Transfer Protocol: - Transfer of care checklist used  ASA# 14 - Acute Post-op Pain: ASA14B - Patient did NOT experience pain >= 7 out of 10

## 2021-06-14 NOTE — PROGRESS NOTES
Call from [facility].  They received orders for liquid oxygen on Friday.  Patient is no longer a [facility] patient.  Receives his oxygen from [facility].  Orders refaxed to [facility] for liquid oxygen.

## 2021-06-14 NOTE — PROGRESS NOTES
Patient oxygen saturation on RA at rest is 84%.  Oxygen saturation with 3LPM at rest is 93%.    After ambulating 100ft on 3LPM oxygen saturation is 85%.  After ambulating 150ft on 4LPM oxygen saturation is 85%.  After ambulating 150ft on 5LPM oxygen saturation is 86%.    Patient was not able to compete walk test.    Patient is ambulatory within his/her home.     ...........................Yrn SALAMANCA 12/12/2017 2:07 PM

## 2021-06-14 NOTE — ANESTHESIA POSTPROCEDURE EVALUATION
Patient: Aydin Knutson Jr.  * No procedures listed *  Anesthesia type: general    Patient location: Telemetry/Step Down Unit  Last vitals:   Vitals:    12/07/17 1327   BP: 144/79   Pulse: 82   Resp: (!) 29   Temp:    SpO2: 100%     Post vital signs: stable  Level of consciousness: awake, alert and oriented  Post-anesthesia pain: pain controlled  Post-anesthesia nausea and vomiting: no  Pulmonary: unassisted, nasal cannula  Cardiovascular: stable and blood pressure at baseline  Hydration: adequate  Anesthetic events: no    QCDR Measures:  ASA# 11 - Janice-op Cardiac Arrest: ASA11B - Patient did NOT experience unanticipated cardiac arrest  ASA# 12 - Janice-op Mortality Rate: ASA12B - Patient did NOT die  ASA# 13 - PACU Re-Intubation Rate: NA - No ETT / LMA used for case  ASA# 10 - Composite Anes Safety: ASA10A - No serious adverse event    Additional Notes:  Pt will be monitored for minimum of 2 hours post procedure because of SOFI and pulmonary fibrosis.  Pt is alert and talking now.

## 2021-06-14 NOTE — PROGRESS NOTES
Patient instructed in use of Arnuity ellipta inhaler (200mcg).  Patient able to use the ellipta device without any difficulties.  Return demo and first dose given in clinic.  Samples given.  Patient instructed to rinse mouth after each use.

## 2021-06-14 NOTE — PROGRESS NOTES
Phone discussion with Wu today.  He is feeling significantly worse.  Possible weight gain, low sats, not much edema, HR 50s-90s in fib.    He very much wants to avoid going to the hospital if at all possible.  I am most concerned for pumonary edema related to his afib and heart failure.  There is a possibility that his ILD is worse, although it has been quite stable clinically for years.    Double lasix today  Start prednisone 20 mg - we discussed the high likelihood of hyperglycemia    We will follow closely.  He understands that our primary recommendation is to go to the ED.  He will go if he has any further worsening.    Noel Bird MD  Pulmonary

## 2021-06-14 NOTE — PROGRESS NOTES
Office Visit - Follow Up   Aydin Knutson Jr.   72 y.o. male    Date of Visit: 11/30/2017    Chief Complaint   Patient presents with     Hospital Visit Follow Up        Assessment and Plan   1. Acute diastolic CHF (congestive heart failure)  Improved volume status, continue current furosemide dosing of 40mg TID (pt preference for TID dosing), continue diltiazem and losartan.  Supp O2, probable cardioversion, consider antiarrythmic, per cardiology  - furosemide (LASIX) 40 MG tablet; Take 1 tablet (40 mg total) by mouth 3 (three) times a day.  Dispense: 60 tablet; Refill: 0  - Basic Metabolic Panel; Future  - Magnesium  - BNP(B-type Natriuretic Peptide)  - Basic Metabolic Panel    2. Chronic atrial fibrillation  See above    3. Type 2 diabetes mellitus, controlled  May need NPH if continues on prednisone, d/w pt    4.  Pulmonary fibrosis  - per Dr. Brid, on increased O2  - prednisone helping with cough will see JK in 2 weeks      Return in about 4 weeks (around 12/28/2017) for recheck.     History of Present Illness   This 72 y.o. old man comes in for post hospital follow-up.  He is admitted with congestive heart failure.  He was treated with diuretics.  And discharged with increased dosage.  He has been taking 40 mg 3 times a day at home.  He saw our cardiology in the hospital and he is going to switch to Dr. Ramon Massey.  His breathing is much been much better since discharge.  He is requiring a little bit more oxygen.  Significant reduction in cough with prednisone.  He has a lot of questions on management of A. fib, cardioversion, ablation, antiarrhythmic versus rate control and stroke prevention.  Raya Singh was switched to losartan    Review of Systems: A comprehensive review of systems was negative except as noted.     Medications, Allergies and Problem List   Reviewed and updated     Physical Exam   General Appearance:   No acute distress    /66 (Patient Site: Left Arm, Patient Position: Sitting,  "Cuff Size: Adult Regular)  Pulse 80  Ht 5' 3\" (1.6 m)  Wt 206 lb (93.4 kg)  SpO2 96%  BMI 36.49 kg/m2    HEENT exam is unremarkable  Neck supple no thyromegaly or nodule palpable  Lymphatic no cervical lymphadenopathy  Cardiovascular irregularly irregular, rate controlled, minimal edema  Pulmonary diffuse crackles, no dullness to percussion  Gastrointestinall abdomen soft nontender nondistended no organomegaly  Neurologic exam is non focal  Psychiatric pleasant, no confusion or agitation        Additional Information   Current Outpatient Prescriptions   Medication Sig Dispense Refill     ALPRAZolam (XANAX) 0.25 MG tablet Take 1 tablet (0.25 mg total) by mouth bedtime as needed. 30 tablet 1     apixaban (ELIQUIS) 5 mg Tab tablet Take 1 tablet (5 mg total) by mouth 2 (two) times a day. 60 tablet 0     aspirin 81 MG EC tablet Take 81 mg by mouth daily.       atorvastatin (LIPITOR) 80 MG tablet Take 80 mg by mouth bedtime.       b complex vitamins tablet Take 1 tablet by mouth daily.       bisoprolol (ZEBETA) 5 MG tablet Take 1 tablet (5 mg total) by mouth daily. 90 tablet 0     blood glucose test (GLUCOSE BLOOD) strips Use 1 each As Directed 2 (two) times a day. Accu check Loren and BS checks every morning 100 each 3     cholecalciferol, vitamin D3, 5,000 unit Tab Take 5,000 Units by mouth daily.       diltiazem (DILACOR XR) 240 MG 24 hr capsule Take 240 mg by mouth daily.       furosemide (LASIX) 40 MG tablet Take 1 tablet (40 mg total) by mouth 3 (three) times a day. 60 tablet 0     gabapentin (NEURONTIN) 300 MG capsule Take 300 mg by mouth every morning. ( in addition to 2 capsules in the evening)       gabapentin (NEURONTIN) 300 MG capsule Take 600 mg by mouth every evening. (In addition to one capsule in the morning)       glipiZIDE (GLUCOTROL XL) 10 MG 24 hr tablet Take 10 mg by mouth 2 (two) times a day. With meals       ipratropium (ATROVENT) 0.03 % nasal spray 2 sprays into each nostril daily as needed " for rhinitis.       losartan (COZAAR) 100 MG tablet Take 1 tablet (100 mg total) by mouth daily. 30 tablet 0     metFORMIN (GLUCOPHAGE) 1000 MG tablet Take 1,000 mg by mouth 2 (two) times a day with meals.       omeprazole (PRILOSEC) 20 MG capsule Take 20 mg by mouth daily.       ONETOUCH ULTRA TEST strips USE 1 STRIP AS DIRECTED 2 TIMES A DAY AT 6:00 AM AND 4:00 PM. 100 each 10     potassium chloride (KLOR-CON) 10 MEQ CR tablet Take 10 mEq by mouth daily.       predniSONE (DELTASONE) 20 MG tablet Take 1 tablet (20 mg total) by mouth daily. 14 tablet 0     No current facility-administered medications for this visit.      Allergies   Allergen Reactions     Amlodipine Swelling     Amoxicillin      Severe yeast infection and ended up in ER for this along with bleeding from area      Lisinopril Cough     Cough, but **may** have been from lung disease, not the drug.  TBrinkMD - 3/9/15     Social History   Substance Use Topics     Smoking status: Former Smoker     Packs/day: 0.50     Years: 20.00     Types: Cigarettes     Quit date: 7/11/1986     Smokeless tobacco: Never Used     Alcohol use Yes      Comment: Rare       Review and/or order of clinical lab tests:reviewed  Review and/or order of radiology tests:reviewed  Review and/or order of medicine tests:reviewed  Discussion of test results with performing physician:  Decision to obtain old records and/or obtain history from someone other than the patient:  Review and summarization of old records and/or obtaining history from someone other than the patient and.or discussion of case with another health care provider:Reviewed and summarized HP, DC sum and consults  Independent visualization of image, tracing or specimen itself:       Ramon Burns MD

## 2021-06-14 NOTE — PROGRESS NOTES
Office Visit - Follow Up   Aydin Knutson Jr.   72 y.o. male    Date of Visit: 11/17/2017    Chief Complaint   Patient presents with     Hospital Visit Follow Up        Assessment and Plan   1. Acute diastolic CHF (congestive heart failure)  He may be slightly hypervolemic but I think he may be symptomatic from A. fib and would benefit from a trial of sinus rhythm.  He will start Eliquis and follow-up with Dr. Heath Joy.  We discussed consideration of cardioversion and rhythm control strategy and he will discuss this with his cardiologist.  I would like to keep his medications otherwise the same right now  - Basic Metabolic Panel  - BNP(B-type Natriuretic Peptide)    2. CAD, BMSx4 1998  No evidence of ischemia    3. DM - Type 2 diabetes mellitus without complication, without long-term current use of insulin  Diabetes has been well controlled we will continue his current medication  - Glycosylated Hemoglobin A1c    4. Pulmonary fibrosis - Dr. Bird  I think his recent worsening of breathing is secondary to A. fib and diastolic heart failure.  We were reviewing his labs and he did have a positive ALICIA in the [employer] system.  This is previously negative in 2009.  He does not have any classic features of scleroderma rheumatoid arthritis or lupus.  He does report a significant improvement in breathing and cough with prednisone for a brief period in 2014.  Dr. Noel Bird had discussed consideration of a trial of prednisone and I have a call out to him to discuss this further.  In the meantime we will repeat his ALICIA and identify any antibody that is present  - Antinuclear Antibody (ALICIA) [place]    5. Anemia  This is mild in the hospital and his B12 is been low we will recheck  - Vitamin B12  - Methylmalonic Acid,Serum or Plasma (Vitamin B12 Status)    6. Persistent atrial fibrillation  See above  - Thyroid [place]    Return in about 4 weeks (around 12/15/2017) for recheck.     History of Present Illness   This 72  "y.o. old man with underlying chronic interstitial pneumonia, coronary artery disease, diabetes, gastroesophageal reflux disease with recent admission to [facility] with acute diastolic congestive heart failure in the context of new A. fib.  He was treated with diuresis.  He had an ischemic evaluation including a nuclear medicine stress test which was negative for ischemia.  He had an echocardiogram which showed normal left ventricular function, mild right ventricular systolic dysfunction, no valvular heart disease, mild atrial enlargement.  He was discharged home on his current dose of diltiazem and bisoprolol and Eliquis was added for stroke prevention.  This is quite expensive and he has not yet started this.  He is going to follow-up with his cardiologist, Dr. Heath Joy.  He has needed more oxygen at home, 4 L.  He feels more short of breath.  He had actually been feeling well until this recent episode and had traveled to [place] and his breathing have been stable.    Review of Systems: A comprehensive review of systems was negative except as noted.     Medications, Allergies and Problem List   Reviewed and updated     Physical Exam   General Appearance:   No acute distress    /80 (Patient Site: Right Arm, Patient Position: Sitting, Cuff Size: Adult Regular)  Pulse 88  Ht 5' 3\" (1.6 m)  Wt 213 lb (96.6 kg)  SpO2 92%  BMI 37.73 kg/m2    Cardiovascular irregularly irregular, rate control, no significant murmur gallop or rub, mild lower extremity edema pulmonary lungs with bibasilar crackles, no physical exam evidence of pleural effusion abdomen soft nontender nondistended he has no hot or swollen joints or inflammatory skin lesions.  Neurologic exam is nonfocal psychiatric Pleasant and appropriate     Additional Information   Current Outpatient Prescriptions   Medication Sig Dispense Refill     ALPRAZolam (XANAX) 0.25 MG tablet Take 1 tablet (0.25 mg total) by mouth bedtime as needed. 30 " tablet 1     apixaban (ELIQUIS) 5 mg Tab tablet Take 1 tablet (5 mg total) by mouth 2 (two) times a day. 60 tablet 0     aspirin 81 MG EC tablet Take 81 mg by mouth daily.       atorvastatin (LIPITOR) 80 MG tablet Take 80 mg by mouth bedtime.       b complex vitamins tablet Take 1 tablet by mouth daily.       bisoprolol (ZEBETA) 5 MG tablet Take 1 tablet (5 mg total) by mouth daily. 90 tablet 0     blood glucose test (GLUCOSE BLOOD) strips Use 1 each As Directed 2 (two) times a day. Accu check Loren and BS checks every morning 100 each 3     cholecalciferol, vitamin D3, 5,000 unit Tab Take 5,000 Units by mouth daily.       diltiazem (DILACOR XR) 240 MG 24 hr capsule Take 240 mg by mouth daily.       furosemide (LASIX) 40 MG tablet Take 1 tablet (40 mg total) by mouth 2 (two) times a day at 9am and 6pm. 60 tablet 0     gabapentin (NEURONTIN) 300 MG capsule Take 300 mg by mouth every morning. ( in addition to 2 capsules in the evening)       gabapentin (NEURONTIN) 300 MG capsule Take 600 mg by mouth every evening. (In addition to one capsule in the morning)       glipiZIDE (GLUCOTROL XL) 10 MG 24 hr tablet Take 10 mg by mouth 2 (two) times a day. With meals       ipratropium (ATROVENT) 0.03 % nasal spray 2 sprays into each nostril daily as needed for rhinitis.       irbesartan (AVAPRO) 300 MG tablet Take 300 mg by mouth daily.   3     metFORMIN (GLUCOPHAGE) 1000 MG tablet Take 1,000 mg by mouth 2 (two) times a day with meals.       omeprazole (PRILOSEC) 20 MG capsule Take 20 mg by mouth daily.       ONETOUCH ULTRA TEST strips USE 1 STRIP AS DIRECTED 2 TIMES A DAY AT 6:00 AM AND 4:00 PM. 100 each 10     potassium chloride (KLOR-CON) 10 MEQ CR tablet Take 10 mEq by mouth daily.       No current facility-administered medications for this visit.      Allergies   Allergen Reactions     Amlodipine Swelling     Amoxicillin      Severe yeast infection and ended up in ER for this along with bleeding from area      Lisinopril  Cough     Cough, but **may** have been from lung disease, not the drug.  TBrinkMD - 3/9/15     Social History   Substance Use Topics     Smoking status: Former Smoker     Packs/day: 0.50     Years: 20.00     Types: Cigarettes     Quit date: 7/11/1986     Smokeless tobacco: Never Used     Alcohol use Yes      Comment: Rare       Review and/or order of clinical lab tests: Reviewed and ordered  Review and/or order of radiology tests: Reviewed and ordered  Review and/or order of medicine tests: Viewed and ordered  Discussion of test results with performing physician:  Decision to obtain old records and/or obtain history from someone other than the patient:  Review and summarization of old records and/or obtaining history from someone other than the patient and.or discussion of case with another health care provider: Reviewed his history and physical, discharge summary and cardiac consultations O summarized above.  I also reviewed his visit with pulmonology at [facility], Dr. Noel Stewart's most recent note and I have a page out to Dr. Noel Bird to discuss.  Independent visualization of image, tracing or specimen itself:    Time:      Ramon Burns MD

## 2021-06-14 NOTE — PROGRESS NOTES
1945  [phone] (home)  [phone] (mobile)    +++Important patient information for CSC/Cath Lab staff : PT IS DIABETIC, SOFI AND CPAP, AND ON ELIQUIS+++    [facility] EP Cath Lab Procedure Order   Cardioversion:    Cardioversion     PT IS ON ELIQUIS NO MISSED DOSES AND CLEARED BY BRUCE MOROCHO CNP      Diagnosis:  AF  Anticipated Case Duration:  Standard  Scheduling Needs/Timeframe:  PT IS SET FOR THURS 12/7/2017 AT 12 00 WITH BRUCE MOROCHO CNP    Current Device: None None  Device Company/Device Rep Needed for Procedure: None    Anesthesia:  General-CV Only  Research Protocol:  No    [facility] EP Cath Lab Prep   Ordering Provider: Bruce Morocho NP  Ordering Date: 12/4/2017  Orders Status: Intial order placed and Order set placed  EP NC Contact: Yolie Renee LPN    H&P:  Compled by BRUCE MOROCHO CNP on 12/4/2017  PCP: Ramon Burns MD, [phone]    Pre-op Labs: N/A for procedure    Medical Records Pertinent for Procedure:  N/A    Patient Education:    PT HAS A  FOR PROCEDURE  PT INSTRUCTED TO HOLD ANY VITAMINS, MINERALS, CALCIUM, IRON OR SUPPLEMENTS THE MORNING OF CV  PT IS DIABETIC AND INSTRUCTED TO HOLD HIS METFORMIN, AND GLIPIZIDE  PT INSTRUCTED TO TAKE HIS LASIX, ZEBETA AND ELIQUIS THE MORNING OF CV  PT IS SOFI/CPAP  PT HAS NO MISSED ANY DOSES OF ELIQUIS  PT HAS A FOLLOW UP WITH DR PHELPS 1/8 AND IS TO KEEP THAT.      Teach with Patient: Completed via phone on 12/4/2017    Risks Reviewed:     Cardioversion    >90% acute success rate, <10% failure to convert or   reverts shortly after cardioversion.    <1% embolic event of (CVA, pulmonary embolism, or   other site).    75% risk for superficial burn.  Risks associated with general anesthesia will be addressed by the Anesthesiology Department    Consent: [place] be [place] [facility] [place]    Pre-Procedure Instructions that were Reviewed with Patient:  NPO after midnight, Notified patient of time and date of procedure by CV ,  Transportation arrangements needed s/p procedure, Post-procedure follow up process and Sedation plan/orders    Pre-Procedure Medication Instructions:  Instructions given to pt regarding anticoagulants: Eliquis- instructed to continue anticoagulation uninterrupted through their procedure  Instructions given to pt regarding antiarrhythmic medication: N/A; Pt instructed to continue medication prior to procedure  Instructions for medication, other than anticoagulants/antiarrhythmics listed above, given to pt: to take all morning medications with small sips of water, with the exception of OTC supplements and MVI    Allergies   Allergen Reactions     Amlodipine Swelling     Amoxicillin      Severe yeast infection and ended up in ER for this along with bleeding from area      Lisinopril Cough     Cough, but **may** have been from lung disease, not the drug.  TBrinkMD - 3/9/15       Current Outpatient Prescriptions:      ALPRAZolam (XANAX) 0.25 MG tablet, Take 1 tablet (0.25 mg total) by mouth bedtime as needed., Disp: 30 tablet, Rfl: 1     apixaban (ELIQUIS) 5 mg Tab tablet, Take 1 tablet (5 mg total) by mouth 2 (two) times a day., Disp: 60 tablet, Rfl: 0     aspirin 81 MG EC tablet, Take 81 mg by mouth daily., Disp: , Rfl:      atorvastatin (LIPITOR) 80 MG tablet, Take 80 mg by mouth bedtime., Disp: , Rfl:      b complex vitamins tablet, Take 1 tablet by mouth daily., Disp: , Rfl:      bisoprolol (ZEBETA) 5 MG tablet, Take 1 tablet (5 mg total) by mouth daily., Disp: 90 tablet, Rfl: 0     blood glucose test (GLUCOSE BLOOD) strips, Use 1 each As Directed 2 (two) times a day. Accu check Loren and BS checks every morning, Disp: 100 each, Rfl: 3     cholecalciferol, vitamin D3, 5,000 unit Tab, Take 5,000 Units by mouth daily., Disp: , Rfl:      diltiazem (DILACOR XR) 240 MG 24 hr capsule, Take 240 mg by mouth daily., Disp: , Rfl:      furosemide (LASIX) 40 MG tablet, Takes 2 tabs in am and 1 tab in afternoon., Disp: 60  tablet, Rfl: 0     gabapentin (NEURONTIN) 300 MG capsule, Take 300 mg by mouth every morning. ( in addition to 2 capsules in the evening), Disp: , Rfl:      gabapentin (NEURONTIN) 300 MG capsule, Take 600 mg by mouth every evening. (In addition to one capsule in the morning), Disp: , Rfl:      glipiZIDE (GLUCOTROL XL) 10 MG 24 hr tablet, Take 10 mg by mouth 2 (two) times a day. With meals, Disp: , Rfl:      ipratropium (ATROVENT) 0.03 % nasal spray, 2 sprays into each nostril daily as needed for rhinitis., Disp: , Rfl:      irbesartan (AVAPRO) 300 MG tablet, Take 1 tablet (300 mg total) by mouth at bedtime., Disp: 90 tablet, Rfl: 3     metFORMIN (GLUCOPHAGE) 1000 MG tablet, Take 1,000 mg by mouth 2 (two) times a day with meals., Disp: , Rfl:      omeprazole (PRILOSEC) 20 MG capsule, Take 20 mg by mouth daily., Disp: , Rfl:      ONETOUCH ULTRA TEST strips, USE 1 STRIP AS DIRECTED 2 TIMES A DAY AT 6:00 AM AND 4:00 PM., Disp: 100 each, Rfl: 10     potassium chloride (KLOR-CON) 10 MEQ CR tablet, Take 10 mEq by mouth daily., Disp: , Rfl:      predniSONE (DELTASONE) 20 MG tablet, Take 1 tablet (20 mg total) by mouth daily., Disp: 14 tablet, Rfl: 0

## 2021-06-14 NOTE — PROGRESS NOTES
Patient oxygen saturation with 5LPM at rest is 94%.    Oxygen saturation after ambulating 100ft on 5LPM is 80%.    After ambulating 150ft on 10LPM oxygen saturation is 90%.    Patient is ambulatory within his/her home.     ...........................Yrn SALAMANCA 11/28/2017 11:55 AM

## 2021-06-14 NOTE — ANESTHESIA PREPROCEDURE EVALUATION
Anesthesia Evaluation        Airway   Mallampati: III  Neck ROM: full   Pulmonary - normal exam    breath sounds clear to auscultation  (+) shortness of breath, sleep apnea (BiPAP), a smoker (Former)    ROS comment: Pulmonary fibrosis, chronic home O2.  Up to 5 L/min.  Finished short course of steroids last week.                         Cardiovascular   (+) hypertension, CAD, CABG/stent (s/p stents), dysrhythmias (a.fib), , hypercholesterolemia, PVD (AAA)    (-) murmur  ECG reviewed  Rhythm: irregular  Rate: abnormal,    no murmur      Neuro/Psych    (+) depression, anxiety/panic attacks,     Endo/Other    (+) diabetes mellitus type 2, obesity (BMI 38),      GI/Hepatic/Renal       Other findings: XR CHEST PA AND LATERAL  11/13/2017 12:56 PM     INDICATION: chest pain  COMPARISON: High-resolution chest CT 1/17/2017, chest x-ray 11/8/2016 and multiple prior studies.     FINDINGS: Low lung volumes. Slight increase in the bilateral interstitial opacities compared to last year. Heart border is shaggy due to the underlying pulmonary disease. No effusions.      11/15/17 NM stress  Conclusion      1.The pharmacologic nuclear stress test is negative for inducible myocardial ischemia or infarction.    2.The left ventricular ejection fraction is 62%.    3.When compared to the images of 11/10/2016, there has been no significant change.    4.The findings of this examination were communicated to the nursing staff at [facility] and Dr. Ortiz.    11/13/17 Echo  Summary      1.Left ventricle ejection fraction is normal. The calculated left ventricular ejection fraction is 58%.    2.Intracavitary left ventricular gradient of 6 mmHg.    3.TAPSE is abnormal, which is consistent with abnormal right ventricular systolic function.    4.No hemodynamically significant valvular heart abnormalities, regurgitant lesions of the 4 cardiac valves as listed below.    5.When compared to the previous study dated 11/9/2016, there is a  suggestion of decreased right ventricular wall motion, and pulmonary hypertension is not seen on the current study.        7/11/17  PFTs  Study Result   FEV1/FVC is 86 and is normal.  FEV1 is 51% predicted and is reduced.  FVC is 46% predicted and reduced.  DLCO is 38% predicted and is reduced when it   is corrected for hemoglobin.     Impression: Spirometry is abnormal.    Spirometry is suggestive of restriction consistent with this patient's diagnosis of ILD.  Lung volumes would be required to confirm restriction     Diffusion capacity when corrected for hemoglobin is severely reduced.               Dental    (+) caps    Comment: Molar crowns                       Anesthesia Plan  Planned anesthetic: general mask    ASA 4   Induction: intravenous   Anesthetic plan and risks discussed with: patient and spouse    Post-op plan: routine recovery

## 2021-06-15 PROBLEM — F41.8 DEPRESSION WITH ANXIETY: Status: ACTIVE | Noted: 2017-01-01

## 2021-06-16 PROBLEM — F41.9 ANXIETY: Status: ACTIVE | Noted: 2017-01-01

## 2021-06-16 PROBLEM — I25.10 CORONARY ARTERY DISEASE DUE TO LIPID RICH PLAQUE: Status: ACTIVE | Noted: 2017-01-01

## 2021-06-16 PROBLEM — I25.83 CORONARY ARTERY DISEASE DUE TO LIPID RICH PLAQUE: Status: ACTIVE | Noted: 2017-01-01

## 2021-07-03 NOTE — ADDENDUM NOTE
Addendum Note by Nery Berger RN at 11/28/2017  5:05 PM     Author: Nery Berger RN Service: -- Author Type: Registered Nurse    Filed: 11/28/2017  5:05 PM Encounter Date: 11/28/2017 Status: Signed    : Nery Berger RN (Registered Nurse)    Addended by: NERY BERGER on: 11/28/2017 05:05 PM        Modules accepted: Orders

## 2021-07-03 NOTE — ADDENDUM NOTE
Addendum Note by Haley Bird MD at 6/28/2017 11:43 AM     Author: Haley Bird MD Service: -- Author Type: Physician    Filed: 6/28/2017 11:43 AM Encounter Date: 5/26/2017 Status: Signed    : Haley Bird MD (Physician)    Addended by: HALEY BIRD on: 6/28/2017 11:43 AM        Modules accepted: Orders